# Patient Record
Sex: MALE | Race: WHITE | NOT HISPANIC OR LATINO | Employment: OTHER | URBAN - METROPOLITAN AREA
[De-identification: names, ages, dates, MRNs, and addresses within clinical notes are randomized per-mention and may not be internally consistent; named-entity substitution may affect disease eponyms.]

---

## 2020-06-17 ENCOUNTER — HOSPITAL ENCOUNTER (EMERGENCY)
Facility: HOSPITAL | Age: 75
Discharge: HOME | End: 2020-06-17
Attending: EMERGENCY MEDICINE | Admitting: EMERGENCY MEDICINE

## 2020-06-17 VITALS
SYSTOLIC BLOOD PRESSURE: 128 MMHG | DIASTOLIC BLOOD PRESSURE: 79 MMHG | TEMPERATURE: 97.5 F | BODY MASS INDEX: 26.02 KG/M2 | RESPIRATION RATE: 16 BRPM | WEIGHT: 192.1 LBS | HEART RATE: 87 BPM | HEIGHT: 72 IN | OXYGEN SATURATION: 94 %

## 2020-06-17 DIAGNOSIS — I47.1 SUPRAVENTRICULAR TACHYCARDIA (HCC): Primary | ICD-10-CM

## 2020-06-17 LAB
ALBUMIN SERPL-MCNC: 4.6 G/DL (ref 3.5–5.2)
ALBUMIN/GLOB SERPL: 1.6 G/DL
ALP SERPL-CCNC: 85 U/L (ref 39–117)
ALT SERPL W P-5'-P-CCNC: 16 U/L (ref 1–41)
ANION GAP SERPL CALCULATED.3IONS-SCNC: 14.2 MMOL/L (ref 5–15)
AST SERPL-CCNC: 23 U/L (ref 1–40)
BASOPHILS # BLD AUTO: 0.04 10*3/MM3 (ref 0–0.2)
BASOPHILS NFR BLD AUTO: 0.4 % (ref 0–1.5)
BILIRUB SERPL-MCNC: 0.4 MG/DL (ref 0.2–1.2)
BUN BLD-MCNC: 14 MG/DL (ref 8–23)
BUN/CREAT SERPL: 12.2 (ref 7–25)
CALCIUM SPEC-SCNC: 10 MG/DL (ref 8.6–10.5)
CHLORIDE SERPL-SCNC: 100 MMOL/L (ref 98–107)
CO2 SERPL-SCNC: 24.8 MMOL/L (ref 22–29)
CREAT BLD-MCNC: 1.15 MG/DL (ref 0.76–1.27)
DEPRECATED RDW RBC AUTO: 40.7 FL (ref 37–54)
EOSINOPHIL # BLD AUTO: 0.16 10*3/MM3 (ref 0–0.4)
EOSINOPHIL NFR BLD AUTO: 1.6 % (ref 0.3–6.2)
ERYTHROCYTE [DISTWIDTH] IN BLOOD BY AUTOMATED COUNT: 13.2 % (ref 12.3–15.4)
GFR SERPL CREATININE-BSD FRML MDRD: 62 ML/MIN/1.73
GLOBULIN UR ELPH-MCNC: 2.9 GM/DL
GLUCOSE BLD-MCNC: 114 MG/DL (ref 65–99)
HCT VFR BLD AUTO: 42.3 % (ref 37.5–51)
HGB BLD-MCNC: 14.6 G/DL (ref 13–17.7)
IMM GRANULOCYTES # BLD AUTO: 0.03 10*3/MM3 (ref 0–0.05)
IMM GRANULOCYTES NFR BLD AUTO: 0.3 % (ref 0–0.5)
LYMPHOCYTES # BLD AUTO: 2.94 10*3/MM3 (ref 0.7–3.1)
LYMPHOCYTES NFR BLD AUTO: 29.8 % (ref 19.6–45.3)
MAGNESIUM SERPL-MCNC: 1.7 MG/DL (ref 1.6–2.4)
MCH RBC QN AUTO: 29.7 PG (ref 26.6–33)
MCHC RBC AUTO-ENTMCNC: 34.5 G/DL (ref 31.5–35.7)
MCV RBC AUTO: 86 FL (ref 79–97)
MONOCYTES # BLD AUTO: 0.74 10*3/MM3 (ref 0.1–0.9)
MONOCYTES NFR BLD AUTO: 7.5 % (ref 5–12)
NEUTROPHILS # BLD AUTO: 5.96 10*3/MM3 (ref 1.7–7)
NEUTROPHILS NFR BLD AUTO: 60.4 % (ref 42.7–76)
NRBC BLD AUTO-RTO: 0 /100 WBC (ref 0–0.2)
PLATELET # BLD AUTO: 239 10*3/MM3 (ref 140–450)
PMV BLD AUTO: 10.3 FL (ref 6–12)
POTASSIUM BLD-SCNC: 4.1 MMOL/L (ref 3.5–5.2)
PROT SERPL-MCNC: 7.5 G/DL (ref 6–8.5)
RBC # BLD AUTO: 4.92 10*6/MM3 (ref 4.14–5.8)
SODIUM BLD-SCNC: 139 MMOL/L (ref 136–145)
TROPONIN T SERPL-MCNC: <0.01 NG/ML (ref 0–0.03)
TSH SERPL DL<=0.05 MIU/L-ACNC: 2.28 UIU/ML (ref 0.27–4.2)
WBC NRBC COR # BLD: 9.87 10*3/MM3 (ref 3.4–10.8)

## 2020-06-17 PROCEDURE — 99283 EMERGENCY DEPT VISIT LOW MDM: CPT

## 2020-06-17 PROCEDURE — 84443 ASSAY THYROID STIM HORMONE: CPT | Performed by: EMERGENCY MEDICINE

## 2020-06-17 PROCEDURE — 25010000002 ADENOSINE PER 6 MG: Performed by: EMERGENCY MEDICINE

## 2020-06-17 PROCEDURE — 96374 THER/PROPH/DIAG INJ IV PUSH: CPT

## 2020-06-17 PROCEDURE — 83735 ASSAY OF MAGNESIUM: CPT | Performed by: EMERGENCY MEDICINE

## 2020-06-17 PROCEDURE — 93005 ELECTROCARDIOGRAM TRACING: CPT | Performed by: EMERGENCY MEDICINE

## 2020-06-17 PROCEDURE — 85025 COMPLETE CBC W/AUTO DIFF WBC: CPT | Performed by: EMERGENCY MEDICINE

## 2020-06-17 PROCEDURE — 84484 ASSAY OF TROPONIN QUANT: CPT | Performed by: EMERGENCY MEDICINE

## 2020-06-17 PROCEDURE — 93010 ELECTROCARDIOGRAM REPORT: CPT | Performed by: INTERNAL MEDICINE

## 2020-06-17 PROCEDURE — 80053 COMPREHEN METABOLIC PANEL: CPT | Performed by: EMERGENCY MEDICINE

## 2020-06-17 RX ORDER — SODIUM CHLORIDE 0.9 % (FLUSH) 0.9 %
10 SYRINGE (ML) INJECTION AS NEEDED
Status: DISCONTINUED | OUTPATIENT
Start: 2020-06-17 | End: 2020-06-17 | Stop reason: HOSPADM

## 2020-06-17 RX ORDER — ADENOSINE 3 MG/ML
6 INJECTION, SOLUTION INTRAVENOUS ONCE
Status: COMPLETED | OUTPATIENT
Start: 2020-06-17 | End: 2020-06-17

## 2020-06-17 RX ADMIN — SODIUM CHLORIDE, PRESERVATIVE FREE 10 ML: 5 INJECTION INTRAVENOUS at 19:17

## 2020-06-17 RX ADMIN — ADENOSINE 6 MG: 3 INJECTION, SOLUTION INTRAVENOUS at 19:29

## 2020-06-17 NOTE — ED PROVIDER NOTES
EMERGENCY DEPARTMENT ENCOUNTER    Room Number:  23/23  Date of encounter:  6/17/2020  PCP: Vasile Saba MD  Historian: Patient     I used full protective equipment while examining this patient.  This includes face mask, gloves and protective eyewear.  I washed my hands before entering the room and immediately upon leaving the room      HPI:  Chief Complaint: Rapid heart rate  A complete HPI/ROS/PMH/PSH/SH/FH are unobtainable due to: None    Context: Brad Hernadez is a 74 y.o. male who presents to the ED c/o rapid heart rate.  Symptoms began about 30 minutes ago and are constant.  Patient denies any chest pain or shortness of breath.  He states that he can tell that his heart is fast and found to be fast when he checked with his apple watch.  Patient is had never had prior symptoms in the past.  Nothing tends to make it better, nothing tends to make it worse.  Patient does have a history of hypertension hypercholesterolemia and diabetes.  He does not see a cardiologist and has no chronic heart conditions.  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  Primary care provider is Dr. José Luis Saba and he does not have a cardiologist.      PAST MEDICAL HISTORY  Active Ambulatory Problems     Diagnosis Date Noted   • No Active Ambulatory Problems     Resolved Ambulatory Problems     Diagnosis Date Noted   • No Resolved Ambulatory Problems     No Additional Past Medical History         PAST SURGICAL HISTORY  No past surgical history on file.      FAMILY HISTORY  No family history on file.      SOCIAL HISTORY  Social History     Socioeconomic History   • Marital status:      Spouse name: Not on file   • Number of children: Not on file   • Years of education: Not on file   • Highest education level: Not on file         ALLERGIES  Patient has no known allergies.       REVIEW OF SYSTEMS  Review of Systems   Constitutional: Negative.  Negative for fever.   HENT: Negative.  Negative  for sore throat.    Eyes: Negative.    Respiratory: Negative.  Negative for cough.    Cardiovascular: Positive for palpitations. Negative for chest pain.   Gastrointestinal: Negative.    Genitourinary: Negative.  Negative for dysuria.   Musculoskeletal: Negative.  Negative for back pain.   Skin: Negative.  Negative for rash.   Neurological: Negative.  Negative for headaches.   All other systems reviewed and are negative.          PHYSICAL EXAM    I have reviewed the triage vital signs and nursing notes.    ED Triage Vitals [06/17/20 1859]   Temp Heart Rate Resp BP SpO2   -- (!) 184 -- -- --      Temp src Heart Rate Source Patient Position BP Location FiO2 (%)   -- -- -- -- --       Physical Exam  GENERAL: Alert male in no obvious distress  HENT: nares patent  EYES: no scleral icterus  CV: Tachycardic without murmur  RESPIRATORY: normal effort clear to auscultation bilaterally-saturations 99% on room air  ABDOMEN: soft, nontender-no masses  MUSCULOSKELETAL: no deformity, no swelling or tenderness to palpation  NEURO: Strength, sensation, and coordination are grossly intact.  Speech and mentation are unremarkable  SKIN: warm, dry      LAB RESULTS  Recent Results (from the past 24 hour(s))   Comprehensive Metabolic Panel    Collection Time: 06/17/20  7:15 PM   Result Value Ref Range    Glucose 114 (H) 65 - 99 mg/dL    BUN 14 8 - 23 mg/dL    Creatinine 1.15 0.76 - 1.27 mg/dL    Sodium 139 136 - 145 mmol/L    Potassium 4.1 3.5 - 5.2 mmol/L    Chloride 100 98 - 107 mmol/L    CO2 24.8 22.0 - 29.0 mmol/L    Calcium 10.0 8.6 - 10.5 mg/dL    Total Protein 7.5 6.0 - 8.5 g/dL    Albumin 4.60 3.50 - 5.20 g/dL    ALT (SGPT) 16 1 - 41 U/L    AST (SGOT) 23 1 - 40 U/L    Alkaline Phosphatase 85 39 - 117 U/L    Total Bilirubin 0.4 0.2 - 1.2 mg/dL    eGFR Non African Amer 62 >60 mL/min/1.73    Globulin 2.9 gm/dL    A/G Ratio 1.6 g/dL    BUN/Creatinine Ratio 12.2 7.0 - 25.0    Anion Gap 14.2 5.0 - 15.0 mmol/L   Troponin    Collection  Time: 06/17/20  7:15 PM   Result Value Ref Range    Troponin T <0.010 0.000 - 0.030 ng/mL   Magnesium    Collection Time: 06/17/20  7:15 PM   Result Value Ref Range    Magnesium 1.7 1.6 - 2.4 mg/dL   TSH    Collection Time: 06/17/20  7:15 PM   Result Value Ref Range    TSH 2.280 0.270 - 4.200 uIU/mL   CBC Auto Differential    Collection Time: 06/17/20  7:15 PM   Result Value Ref Range    WBC 9.87 3.40 - 10.80 10*3/mm3    RBC 4.92 4.14 - 5.80 10*6/mm3    Hemoglobin 14.6 13.0 - 17.7 g/dL    Hematocrit 42.3 37.5 - 51.0 %    MCV 86.0 79.0 - 97.0 fL    MCH 29.7 26.6 - 33.0 pg    MCHC 34.5 31.5 - 35.7 g/dL    RDW 13.2 12.3 - 15.4 %    RDW-SD 40.7 37.0 - 54.0 fl    MPV 10.3 6.0 - 12.0 fL    Platelets 239 140 - 450 10*3/mm3    Neutrophil % 60.4 42.7 - 76.0 %    Lymphocyte % 29.8 19.6 - 45.3 %    Monocyte % 7.5 5.0 - 12.0 %    Eosinophil % 1.6 0.3 - 6.2 %    Basophil % 0.4 0.0 - 1.5 %    Immature Grans % 0.3 0.0 - 0.5 %    Neutrophils, Absolute 5.96 1.70 - 7.00 10*3/mm3    Lymphocytes, Absolute 2.94 0.70 - 3.10 10*3/mm3    Monocytes, Absolute 0.74 0.10 - 0.90 10*3/mm3    Eosinophils, Absolute 0.16 0.00 - 0.40 10*3/mm3    Basophils, Absolute 0.04 0.00 - 0.20 10*3/mm3    Immature Grans, Absolute 0.03 0.00 - 0.05 10*3/mm3    nRBC 0.0 0.0 - 0.2 /100 WBC       Ordered the above labs and independently reviewed the results.      RADIOLOGY  No Radiology Exams Resulted Within Past 24 Hours    I ordered the above noted radiological studies. Reviewed by me and discussed with radiologist.  See dictation for official radiology interpretation.      PROCEDURES  Chemical Cardioversion  Date/Time: 6/17/2020 7:34 PM  Performed by: Vishal Rueda MD  Authorized by: Vishal Rueda MD     Consent:     Consent obtained:  Emergent situation  Pre-procedure details:     Cardioversion basis:  Emergent    Rhythm:  Supraventricular tachycardia  Attempt one:     Cardioversion medication:  Adenosine 6mg      Medication outcome:  Conversion to normal  sinus rhythm  Post-procedure details:     Patient status:  Alert    Patient tolerance of procedure:  Tolerated well, no immediate complications          MEDICATIONS GIVEN IN ER    Medications   sodium chloride 0.9 % flush 10 mL (10 mL Intravenous Given 6/17/20 1917)   adenosine (ADENOCARD) injection 6 mg (6 mg Intravenous Given 6/17/20 1929)         PROGRESS, DATA ANALYSIS, CONSULTS, AND MEDICAL DECISION MAKING    All labs have been independently reviewed by me.  All radiology studies have been reviewed by me and discussed with radiologist dictating the report.   EKG's independently viewed and interpreted by me.  Discussion below represents my analysis of pertinent findings related to patient's condition, differential diagnosis, treatment plan and final disposition.      ED Course as of Jun 17 2004 Wed Jun 17, 2020 1906   EKG          EKG time: 1905  Rhythm/Rate:   P waves and NV: P waves not noted  QRS, axis: Normal axis, LVH  ST and T waves: Nonspecific changes    Interpreted Contemporaneously by me, independently viewed  No prior to compare      [DB]   1933 Patient was cardioverted with 6 mg of adenosine successfully.  He is in apparent sinus rhythm and feeling much better.    [DB]   1937 EKG          EKG time: 1935  Rhythm/Rate: Sinus 78  P waves and NV: Normal P waves and NV intervals  QRS, axis: Normal axis, normal QRS  ST and T waves: Mild lateral ST depression    Interpreted Contemporaneously by me, independently viewed  Heart rate is much improved compared to prior      [DB]   2003 Labs are reviewed and are unremarkable.  This includes a negative bone.  CBC CMP, TSH and magnesium are also all remarkable.  Patient is remained in normal sinus rhythm and feeling well.  At this point I think he is safe to be discharged and follow-up with cardiology as an outpatient.    [DB]      ED Course User Index  [DB] Vishal Rueda MD       AS OF 20:04 VITALS:    BP - 135/67  HR - 82  TEMP - 97.3 °F (36.3 °C)  (Tympanic)  O2 SATS - 97%      DIAGNOSIS  Final diagnoses:   Supraventricular tachycardia (CMS/HCC)         DISPOSITION  DISCHARGE    Patient discharged in stable condition.    Reviewed implications of results, diagnosis, meds, responsibility to follow up, warning signs and symptoms of possible worsening, potential complications and reasons to return to ER, including increased chest pain, shortness of breath or as needed.    Patient/Family voiced understanding of above instructions.    Discussed plan for discharge, as there is no emergent indication for admission. Patient referred to primary care provider for BP management due to today's BP. Pt/family is agreeable and understands need for follow up and repeat testing.  Pt is aware that discharge does not mean that nothing is wrong but it indicates no emergency is present that requires admission and they must continue care with follow-up as given below or physician of their choice.     FOLLOW-UP  Vasile Saba MD  [address]  [facility] [address] [phone]      Call for Appointment    [facility]  [address] [facility] [address] [phone]    Call for Appointment         Medication List      No changes were made to your prescriptions during this visit.                Vishal Rueda MD  06/17/20 2004

## 2020-06-18 NOTE — DISCHARGE INSTRUCTIONS
Follow-up with your primary care provider or cardiologist for further evaluation of this abnormal heart rhythm.  Return to the ED for worsening symptoms or as needed.

## 2020-06-29 DIAGNOSIS — I47.1 PAROXYSMAL SVT (SUPRAVENTRICULAR TACHYCARDIA) (HCC): Primary | ICD-10-CM

## 2020-06-29 PROBLEM — I47.10 PAROXYSMAL SVT (SUPRAVENTRICULAR TACHYCARDIA): Status: ACTIVE | Noted: 2020-06-29

## 2020-07-03 ENCOUNTER — HOSPITAL ENCOUNTER (OUTPATIENT)
Facility: HOSPITAL | Age: 75
Setting detail: HOSPITAL OUTPATIENT SURGERY
Discharge: HOME | End: 2020-07-03
Attending: INTERNAL MEDICINE | Admitting: INTERNAL MEDICINE

## 2020-07-03 VITALS
WEIGHT: 188.25 LBS | RESPIRATION RATE: 16 BRPM | SYSTOLIC BLOOD PRESSURE: 176 MMHG | HEIGHT: 72 IN | DIASTOLIC BLOOD PRESSURE: 93 MMHG | HEART RATE: 60 BPM | TEMPERATURE: 97.6 F | BODY MASS INDEX: 25.5 KG/M2 | OXYGEN SATURATION: 98 %

## 2020-07-03 DIAGNOSIS — I47.1 PAROXYSMAL SVT (SUPRAVENTRICULAR TACHYCARDIA) (HCC): ICD-10-CM

## 2020-07-03 LAB — SARS-COV-2 RDRP RESP QL NAA+PROBE: NOT DETECTED

## 2020-07-03 PROCEDURE — 99152 MOD SED SAME PHYS/QHP 5/>YRS: CPT | Performed by: INTERNAL MEDICINE

## 2020-07-03 PROCEDURE — 25010000002 MIDAZOLAM PER 1 MG: Performed by: INTERNAL MEDICINE

## 2020-07-03 PROCEDURE — C1733 CATH, EP, OTHR THAN COOL-TIP: HCPCS | Performed by: INTERNAL MEDICINE

## 2020-07-03 PROCEDURE — 93653 COMPRE EP EVAL TX SVT: CPT | Performed by: INTERNAL MEDICINE

## 2020-07-03 PROCEDURE — 93005 ELECTROCARDIOGRAM TRACING: CPT | Performed by: INTERNAL MEDICINE

## 2020-07-03 PROCEDURE — 82962 GLUCOSE BLOOD TEST: CPT

## 2020-07-03 PROCEDURE — C1766 INTRO/SHEATH,STRBLE,NON-PEEL: HCPCS | Performed by: INTERNAL MEDICINE

## 2020-07-03 PROCEDURE — C1894 INTRO/SHEATH, NON-LASER: HCPCS | Performed by: INTERNAL MEDICINE

## 2020-07-03 PROCEDURE — 87635 SARS-COV-2 COVID-19 AMP PRB: CPT | Performed by: INTERNAL MEDICINE

## 2020-07-03 PROCEDURE — 93613 INTRACARDIAC EPHYS 3D MAPG: CPT | Performed by: INTERNAL MEDICINE

## 2020-07-03 PROCEDURE — C1730 CATH, EP, 19 OR FEW ELECT: HCPCS | Performed by: INTERNAL MEDICINE

## 2020-07-03 PROCEDURE — 99153 MOD SED SAME PHYS/QHP EA: CPT | Performed by: INTERNAL MEDICINE

## 2020-07-03 PROCEDURE — 25010000002 FENTANYL CITRATE (PF) 100 MCG/2ML SOLUTION: Performed by: INTERNAL MEDICINE

## 2020-07-03 PROCEDURE — C9803 HOPD COVID-19 SPEC COLLECT: HCPCS | Performed by: INTERNAL MEDICINE

## 2020-07-03 PROCEDURE — 93010 ELECTROCARDIOGRAM REPORT: CPT | Performed by: INTERNAL MEDICINE

## 2020-07-03 PROCEDURE — 93623 PRGRMD STIMJ&PACG IV RX NFS: CPT | Performed by: INTERNAL MEDICINE

## 2020-07-03 RX ORDER — ATORVASTATIN CALCIUM 20 MG/1
20 TABLET, FILM COATED ORAL DAILY
COMMUNITY

## 2020-07-03 RX ORDER — SODIUM CHLORIDE 9 MG/ML
75 INJECTION, SOLUTION INTRAVENOUS CONTINUOUS
Status: DISCONTINUED | OUTPATIENT
Start: 2020-07-03 | End: 2020-07-07 | Stop reason: HOSPADM

## 2020-07-03 RX ORDER — SODIUM CHLORIDE 0.9 % (FLUSH) 0.9 %
10 SYRINGE (ML) INJECTION AS NEEDED
Status: DISCONTINUED | OUTPATIENT
Start: 2020-07-03 | End: 2020-07-07 | Stop reason: HOSPADM

## 2020-07-03 RX ORDER — LIDOCAINE HYDROCHLORIDE 20 MG/ML
INJECTION, SOLUTION INFILTRATION; PERINEURAL AS NEEDED
Status: DISCONTINUED | OUTPATIENT
Start: 2020-07-03 | End: 2020-07-03 | Stop reason: HOSPADM

## 2020-07-03 RX ORDER — MIDAZOLAM HYDROCHLORIDE 1 MG/ML
INJECTION INTRAMUSCULAR; INTRAVENOUS AS NEEDED
Status: DISCONTINUED | OUTPATIENT
Start: 2020-07-03 | End: 2020-07-03 | Stop reason: HOSPADM

## 2020-07-03 RX ORDER — LORATADINE 10 MG/1
10 TABLET ORAL DAILY
COMMUNITY

## 2020-07-03 RX ORDER — TAMSULOSIN HYDROCHLORIDE 0.4 MG/1
1 CAPSULE ORAL DAILY
COMMUNITY

## 2020-07-03 RX ORDER — GLIMEPIRIDE 2 MG/1
2 TABLET ORAL
COMMUNITY

## 2020-07-03 RX ORDER — SODIUM CHLORIDE 0.9 % (FLUSH) 0.9 %
3 SYRINGE (ML) INJECTION EVERY 12 HOURS SCHEDULED
Status: DISCONTINUED | OUTPATIENT
Start: 2020-07-03 | End: 2020-07-07 | Stop reason: HOSPADM

## 2020-07-03 RX ORDER — MELATONIN
2000 DAILY
COMMUNITY

## 2020-07-03 RX ORDER — OLMESARTAN MEDOXOMIL 20 MG/1
20 TABLET ORAL DAILY
COMMUNITY

## 2020-07-03 RX ORDER — FENTANYL CITRATE 50 UG/ML
INJECTION, SOLUTION INTRAMUSCULAR; INTRAVENOUS AS NEEDED
Status: DISCONTINUED | OUTPATIENT
Start: 2020-07-03 | End: 2020-07-03 | Stop reason: HOSPADM

## 2020-07-03 RX ORDER — ASPIRIN 81 MG/1
81 TABLET ORAL DAILY
COMMUNITY

## 2020-07-03 RX ORDER — ONDANSETRON 2 MG/ML
4 INJECTION INTRAMUSCULAR; INTRAVENOUS EVERY 6 HOURS PRN
Status: DISCONTINUED | OUTPATIENT
Start: 2020-07-03 | End: 2020-07-07 | Stop reason: HOSPADM

## 2020-07-03 RX ADMIN — SODIUM CHLORIDE 75 ML/HR: 9 INJECTION, SOLUTION INTRAVENOUS at 10:28

## 2020-07-03 NOTE — H&P
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  He was seen in the ER at [facility] with an episode of tachy palpitations and was found to have SVT at a rate of 174bp.  His echo was unremarkable.    Review of Systems   Constitutional: Negative for fatigue.   Cardiovascular: Positive for palpitations.   All other systems reviewed and are negative.         Past Medical History:   Diagnosis Date   • Supraventricular tachycardia (CMS/HCC)        Objective      Vital Signs       Physical Exam   Constitutional: He is oriented to person, place, and time. He appears well-developed and well-nourished.   HENT:   Head: Normocephalic and atraumatic.   Eyes: Pupils are equal, round, and reactive to light. EOM are normal.   Neck: Normal range of motion. Neck supple.   Cardiovascular: Normal rate and regular rhythm.   Pulmonary/Chest: Effort normal and breath sounds normal.   Abdominal: Soft. Bowel sounds are normal.   Musculoskeletal: Normal range of motion.   Neurological: He is alert and oriented to person, place, and time.   Skin: Skin is warm and dry.   Psychiatric: He has a normal mood and affect.       Results Review:    I reviewed the patient's new clinical results.      Assessment/Plan       Paroxysmal SVT (supraventricular tachycardia) (CMS/HCC)      Assessment & Plan   1. 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    I discussed the patients findings and my recommendations with patient    Bhupinder Bolden MD  07/03/20  07:18

## 2020-07-03 NOTE — DISCHARGE SUMMARY
\[facility] [place] [facility]\"" [facility]    DISCHARGE SUMMARY      Patient Name: Brad Hernadez  :1945  74 y.o.    Date of Admit: (Not on file)  Date of Discharge:  7/3/2020    Discharge Diagnosis:  Problem List Items Addressed This Visit        Cardiovascular and Mediastinum    * (Principal) Paroxysmal SVT (supraventricular tachycardia) (CMS/[facility])    Relevant Orders    EP/CRM Study          Hospital Course: Patient was brought to the EP lab where he was found to have dual AV node physiology and inducible atrial echoes but no AVNRT.  He underwent successful slow pathway ablation without emily-procedural complications.      Procedures Performed  Procedure(s):  Ablation SVT  3D MAPPING INSYTE EP       Consults     No orders found from 2020 to 2020.          Pertinent Test Results:         Invalid input(s): LABALBU, PROT      @LABRCNT(bnp)@                    Condition on Discharge: stable    Discharge Medications     Discharge Medications      Notice    Cannot display discharge medications because the patient has not yet been admitted.         Discharge Diet: regular    Activity at Discharge:     Discharge disposition: home    Follow-up Appointments  No future appointments.      Test Results Pending at Discharge       Bhupinder Bolden MD, FACC    20  12:31

## 2020-07-03 NOTE — DISCHARGE INSTRUCTIONS
[facility]  Cardiology  [address] [place] [address]  [phone]    Coronary Ablation After Care    Refer to this sheet in the next few weeks. These instructions provide you with information on caring for yourself after your procedure. Your health care provider may also give you more specific instructions. Your treatment has been planned according to current medical practices, but problems sometimes occur. Call your health care provider if you have any problems or questions after your procedure.      What to Expect After the Procedure:  After your procedure, it is typical to have the following sensations:  · Minor discomfort or tenderness and a small bump at the catheter insertion site(s). The bump(s) should usually decrease in size and tenderness within 1 to 2 weeks.  · Any bruising will usually fade within 2 to 4 weeks.  Home Care Instructions:  · Do not apply powder or lotion to the site.  · Do not take baths, swim, or use a hot tub until your health care provider approves and the site is completely healed.  · Do not bend, squat, or lift anything over 20 lb (9 kg) or as directed by your health care provider. However, we recommend lifting nothing heavier than a gallon of milk.    · You may shower 24 hours after the procedure. Remove the bandage (dressing) and gently wash the site with plain soap and water. Gently pat the site dry. You may apply a band aid daily for 2 days if desired.    · Inspect the site at least twice daily.  · Increase your fluid intake for the next 2 days.    · Limit your activity for the first 48 hours.   · Avoid strenuous activity for 1 week or as advised by your physician.    · Follow instructions about when you can drive or return to work as directed by your physician.    · Hold direct pressure over the site when you cough, sneeze, laugh or change positions.  Do this for the next 2 days.    Call Your Doctor If:  · You have drainage (other than a small amount of  blood on the dressing).  · You have chills or a fever > 101.  · You have redness, warmth, swelling (larger than a walnut), or pain at the insertion   · You develop palpitations, chest pain or shortness of breath, feel faint, or pass out.  · You develop pain, discoloration, coldness, numbness, tingling, or severe bruising in the leg that held the catheter.  · You develop bleeding from any other place, such as the bowels.  · You have heavy bleeding from the site.  If this happens, hold pressure on the site and call 911.        Make Sure You:  · Understand these instructions.  · Will watch your condition.  · Will get help right away if you are not doing well or get worse.

## 2020-07-07 LAB — GLUCOSE BLDC GLUCOMTR-MCNC: 105 MG/DL (ref 70–130)

## 2022-04-08 ENCOUNTER — IMMUNIZATION (OUTPATIENT)
Dept: VACCINE CLINIC | Facility: HOSPITAL | Age: 77
End: 2022-04-08

## 2022-04-08 DIAGNOSIS — Z23 NEED FOR VACCINATION: Primary | ICD-10-CM

## 2022-04-08 PROCEDURE — 0054A HC ADM SARSCV2 30MCG TRS-SUCR BOOSTER: CPT | Performed by: INTERNAL MEDICINE

## 2022-04-08 PROCEDURE — 91305 HC SARSCOV2 VAC 30 MCG TRS-SUCR PFIZER: CPT | Performed by: INTERNAL MEDICINE

## 2024-12-18 ENCOUNTER — HOSPITAL ENCOUNTER (EMERGENCY)
Facility: HOSPITAL | Age: 79
Discharge: HOME | End: 2024-12-18
Attending: EMERGENCY MEDICINE | Admitting: EMERGENCY MEDICINE
Payer: MEDICARE

## 2024-12-18 VITALS
HEART RATE: 71 BPM | DIASTOLIC BLOOD PRESSURE: 67 MMHG | OXYGEN SATURATION: 98 % | RESPIRATION RATE: 16 BRPM | TEMPERATURE: 96 F | SYSTOLIC BLOOD PRESSURE: 111 MMHG

## 2024-12-18 DIAGNOSIS — E83.42 HYPOMAGNESEMIA: ICD-10-CM

## 2024-12-18 DIAGNOSIS — I47.10 SVT (SUPRAVENTRICULAR TACHYCARDIA): Primary | ICD-10-CM

## 2024-12-18 LAB
ANION GAP SERPL CALCULATED.3IONS-SCNC: 15.6 MMOL/L (ref 5–15)
BASOPHILS # BLD AUTO: 0.05 10*3/MM3 (ref 0–0.2)
BASOPHILS NFR BLD AUTO: 0.4 % (ref 0–1.5)
BUN SERPL-MCNC: 18 MG/DL (ref 8–23)
BUN/CREAT SERPL: 16.5 (ref 7–25)
CALCIUM SPEC-SCNC: 9.5 MG/DL (ref 8.6–10.5)
CHLORIDE SERPL-SCNC: 101 MMOL/L (ref 98–107)
CO2 SERPL-SCNC: 22.4 MMOL/L (ref 22–29)
CREAT SERPL-MCNC: 1.09 MG/DL (ref 0.76–1.27)
DEPRECATED RDW RBC AUTO: 44 FL (ref 37–54)
EGFRCR SERPLBLD CKD-EPI 2021: 69 ML/MIN/1.73
EOSINOPHIL # BLD AUTO: 0.16 10*3/MM3 (ref 0–0.4)
EOSINOPHIL NFR BLD AUTO: 1.1 % (ref 0.3–6.2)
ERYTHROCYTE [DISTWIDTH] IN BLOOD BY AUTOMATED COUNT: 13.7 % (ref 12.3–15.4)
GEN 5 1HR TROPONIN T REFLEX: 16 NG/L
GLUCOSE SERPL-MCNC: 137 MG/DL (ref 65–99)
HCT VFR BLD AUTO: 39.8 % (ref 37.5–51)
HGB BLD-MCNC: 13.7 G/DL (ref 13–17.7)
IMM GRANULOCYTES # BLD AUTO: 0.06 10*3/MM3 (ref 0–0.05)
IMM GRANULOCYTES NFR BLD AUTO: 0.4 % (ref 0–0.5)
LYMPHOCYTES # BLD AUTO: 2.05 10*3/MM3 (ref 0.7–3.1)
LYMPHOCYTES NFR BLD AUTO: 14.6 % (ref 19.6–45.3)
MAGNESIUM SERPL-MCNC: 1.5 MG/DL (ref 1.6–2.4)
MCH RBC QN AUTO: 30 PG (ref 26.6–33)
MCHC RBC AUTO-ENTMCNC: 34.4 G/DL (ref 31.5–35.7)
MCV RBC AUTO: 87.3 FL (ref 79–97)
MONOCYTES # BLD AUTO: 0.83 10*3/MM3 (ref 0.1–0.9)
MONOCYTES NFR BLD AUTO: 5.9 % (ref 5–12)
NEUTROPHILS NFR BLD AUTO: 10.9 10*3/MM3 (ref 1.7–7)
NEUTROPHILS NFR BLD AUTO: 77.6 % (ref 42.7–76)
NRBC BLD AUTO-RTO: 0 /100 WBC (ref 0–0.2)
PLATELET # BLD AUTO: 276 10*3/MM3 (ref 140–450)
PMV BLD AUTO: 10.2 FL (ref 6–12)
POTASSIUM SERPL-SCNC: 3.8 MMOL/L (ref 3.5–5.2)
QT INTERVAL: 269 MS
QTC INTERVAL: 457 MS
RBC # BLD AUTO: 4.56 10*6/MM3 (ref 4.14–5.8)
SODIUM SERPL-SCNC: 139 MMOL/L (ref 136–145)
TROPONIN T NUMERIC DELTA: 4 NG/L
TROPONIN T SERPL HS-MCNC: 12 NG/L
WBC NRBC COR # BLD AUTO: 14.05 10*3/MM3 (ref 3.4–10.8)

## 2024-12-18 PROCEDURE — 25010000002 ADENOSINE PER 6 MG: Performed by: EMERGENCY MEDICINE

## 2024-12-18 PROCEDURE — 85025 COMPLETE CBC W/AUTO DIFF WBC: CPT | Performed by: EMERGENCY MEDICINE

## 2024-12-18 PROCEDURE — 93010 ELECTROCARDIOGRAM REPORT: CPT | Performed by: INTERNAL MEDICINE

## 2024-12-18 PROCEDURE — 83735 ASSAY OF MAGNESIUM: CPT | Performed by: EMERGENCY MEDICINE

## 2024-12-18 PROCEDURE — 96374 THER/PROPH/DIAG INJ IV PUSH: CPT

## 2024-12-18 PROCEDURE — 80048 BASIC METABOLIC PNL TOTAL CA: CPT | Performed by: EMERGENCY MEDICINE

## 2024-12-18 PROCEDURE — 84484 ASSAY OF TROPONIN QUANT: CPT | Performed by: EMERGENCY MEDICINE

## 2024-12-18 PROCEDURE — 36415 COLL VENOUS BLD VENIPUNCTURE: CPT

## 2024-12-18 PROCEDURE — 93005 ELECTROCARDIOGRAM TRACING: CPT | Performed by: EMERGENCY MEDICINE

## 2024-12-18 PROCEDURE — 99285 EMERGENCY DEPT VISIT HI MDM: CPT

## 2024-12-18 RX ORDER — ADENOSINE 3 MG/ML
6 INJECTION, SOLUTION INTRAVENOUS ONCE
Status: COMPLETED | OUTPATIENT
Start: 2024-12-18 | End: 2024-12-18

## 2024-12-18 RX ADMIN — MAGNESIUM OXIDE 400 MG (241.3 MG MAGNESIUM) TABLET 400 MG: TABLET at 16:31

## 2024-12-18 RX ADMIN — ADENOSINE 6 MG: 3 INJECTION, SOLUTION INTRAVENOUS at 15:57

## 2024-12-18 NOTE — DISCHARGE INSTRUCTIONS
Follow-up with Dr. Bolden for further evaluation.  Return immediately for any further episodes of fast heart rate, any chest pain or shortness of breath.

## 2024-12-18 NOTE — ED PROVIDER NOTES
EMERGENCY DEPARTMENT ENCOUNTER  Room Number:  23/23  PCP: Jonathan Mccollum MD  Independent Historians: Patient and EMS      HPI:  Chief Complaint: had concerns including Rapid Heart Rate.     A complete HPI/ROS/PMH/PSH/SH/FH are unobtainable due to: None    Chronic or social conditions impacting patient care (Social Determinants of Health): None      Context: Brad Hernadez is a 79 y.o. male with a medical history of SVT, hypertension, diabetes who presents to the ED c/o acute palpitations.  The patient reports he was sitting and working at his computer when he started having palpitations.  He reports a history of SVT.  He reports he had an ablation several years ago.  He denies any chest pain or shortness of breath today.  He called EMS and they did an EKG that showed SVT.  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  He remains in SVT upon arrival.  He has no other symptoms.      Review of prior external notes (non-ED) -and- Review of prior external test results outside of this encounter:  Laboratory evaluation dated 3/27/2023 shows normal CMP .  Discharge summary 7/3/2020 where he was found to have dual AV node physiology and inducible atrial echoes but no AVNRT    Prescription drug monitoring program review:         PAST MEDICAL HISTORY  Active Ambulatory Problems     Diagnosis Date Noted    Paroxysmal SVT (supraventricular tachycardia) 06/29/2020     Resolved Ambulatory Problems     Diagnosis Date Noted    No Resolved Ambulatory Problems     Past Medical History:   Diagnosis Date    Diabetes mellitus     Hyperlipidemia     Hypertension     Supraventricular tachycardia          PAST SURGICAL HISTORY  Past Surgical History:   Procedure Laterality Date    CARDIAC CATHETERIZATION      CARDIAC ELECTROPHYSIOLOGY PROCEDURE N/A 7/3/2020    Procedure: Ablation SVT;  Surgeon: Bhupinder Bolden MD;  Location: [facility] INVASIVE LOCATION;  Service: Cardiovascular;   Laterality: N/A;    CYSTOSCOPY W/ LASER LITHOTRIPSY      LIPOMA EXCISION           FAMILY HISTORY  No family history on file.      SOCIAL HISTORY  Social History     Socioeconomic History    Marital status:    Tobacco Use    Smoking status: Former     Types: Cigars    Smokeless tobacco: Never   Substance and Sexual Activity    Alcohol use: Yes     Comment: OCCASIONALLY    Drug use: Never    Sexual activity: Defer         ALLERGIES  Patient has no known allergies.      REVIEW OF SYSTEMS  Review of Systems  Included in HPI  All systems reviewed and negative except for those discussed in HPI.      PHYSICAL EXAM    I have reviewed the triage vital signs and nursing notes.    ED Triage Vitals [12/18/24 1542]   Temp Heart Rate Resp BP SpO2   96 °F (35.6 °C) (!) 180 16 146/80 100 %      Temp src Heart Rate Source Patient Position BP Location FiO2 (%)   Tympanic -- -- -- --       Physical Exam  GENERAL: Awake, alert, no acute distress  SKIN: Warm, dry  HENT: Normocephalic, atraumatic  EYES: no scleral icterus  CV: regular rhythm, tachycardic rate  RESPIRATORY: normal effort, lungs clear  ABDOMEN: soft, nontender, nondistended  MUSCULOSKELETAL: no deformity, no calf tenderness or swelling  NEURO: alert, moves all extremities, follows commands            LAB RESULTS  Recent Results (from the past 24 hours)   Basic Metabolic Panel    Collection Time: 12/18/24  3:47 PM    Specimen: Blood   Result Value Ref Range    Glucose 137 (H) 65 - 99 mg/dL    BUN 18 8 - 23 mg/dL    Creatinine 1.09 0.76 - 1.27 mg/dL    Sodium 139 136 - 145 mmol/L    Potassium 3.8 3.5 - 5.2 mmol/L    Chloride 101 98 - 107 mmol/L    CO2 22.4 22.0 - 29.0 mmol/L    Calcium 9.5 8.6 - 10.5 mg/dL    BUN/Creatinine Ratio 16.5 7.0 - 25.0    Anion Gap 15.6 (H) 5.0 - 15.0 mmol/L    eGFR 69.0 >60.0 mL/min/1.73   High Sensitivity Troponin T    Collection Time: 12/18/24  3:47 PM    Specimen: Blood   Result Value Ref Range    HS Troponin T 12 <22 ng/L   Magnesium     Collection Time: 12/18/24  3:47 PM    Specimen: Blood   Result Value Ref Range    Magnesium 1.5 (L) 1.6 - 2.4 mg/dL   CBC Auto Differential    Collection Time: 12/18/24  3:47 PM    Specimen: Blood   Result Value Ref Range    WBC 14.05 (H) 3.40 - 10.80 10*3/mm3    RBC 4.56 4.14 - 5.80 10*6/mm3    Hemoglobin 13.7 13.0 - 17.7 g/dL    Hematocrit 39.8 37.5 - 51.0 %    MCV 87.3 79.0 - 97.0 fL    MCH 30.0 26.6 - 33.0 pg    MCHC 34.4 31.5 - 35.7 g/dL    RDW 13.7 12.3 - 15.4 %    RDW-SD 44.0 37.0 - 54.0 fl    MPV 10.2 6.0 - 12.0 fL    Platelets 276 140 - 450 10*3/mm3    Neutrophil % 77.6 (H) 42.7 - 76.0 %    Lymphocyte % 14.6 (L) 19.6 - 45.3 %    Monocyte % 5.9 5.0 - 12.0 %    Eosinophil % 1.1 0.3 - 6.2 %    Basophil % 0.4 0.0 - 1.5 %    Immature Grans % 0.4 0.0 - 0.5 %    Neutrophils, Absolute 10.90 (H) 1.70 - 7.00 10*3/mm3    Lymphocytes, Absolute 2.05 0.70 - 3.10 10*3/mm3    Monocytes, Absolute 0.83 0.10 - 0.90 10*3/mm3    Eosinophils, Absolute 0.16 0.00 - 0.40 10*3/mm3    Basophils, Absolute 0.05 0.00 - 0.20 10*3/mm3    Immature Grans, Absolute 0.06 (H) 0.00 - 0.05 10*3/mm3    nRBC 0.0 0.0 - 0.2 /100 WBC   ECG 12 Lead Rhythm Change    Collection Time: 12/18/24  3:49 PM   Result Value Ref Range    QT Interval 269 ms    QTC Interval 457 ms   High Sensitivity Troponin T 1Hr    Collection Time: 12/18/24  5:00 PM    Specimen: Blood   Result Value Ref Range    HS Troponin T 16 <22 ng/L    Troponin T Numeric Delta 4 (C) Abnormal if >/=3 ng/L         RADIOLOGY  No Radiology Exams Resulted Within Past 24 Hours      MEDICATIONS GIVEN IN ER  Medications   adenosine (ADENOCARD) injection 6 mg (6 mg Intravenous Given 12/18/24 1557)   magnesium oxide (MAG-OX) tablet 400 mg (400 mg Oral Given 12/18/24 1631)         ORDERS PLACED DURING THIS VISIT:  Orders Placed This Encounter   Procedures    Chemical Cardioversion    Basic Metabolic Panel    High Sensitivity Troponin T    Magnesium    CBC Auto Differential    High Sensitivity  Troponin T 1Hr    ECG 12 Lead Rhythm Change    CBC & Differential         OUTPATIENT MEDICATION MANAGEMENT:  No current Epic-ordered facility-administered medications on file.     Current Outpatient Medications Ordered in Epic   Medication Sig Dispense Refill    aspirin 81 MG EC tablet Take 81 mg by mouth Daily.      atorvastatin (LIPITOR) 20 MG tablet Take 20 mg by mouth Daily.      cholecalciferol (VITAMIN D3) 25 MCG (1000 UT) tablet Take 2,000 Units by mouth Daily.      glimepiride (AMARYL) 2 MG tablet Take 2 mg by mouth Every Morning Before Breakfast.      loratadine (CLARITIN) 10 MG tablet Take 10 mg by mouth Daily.      metFORMIN (GLUCOPHAGE) 500 MG tablet Take 500 mg by mouth 2 (Two) Times a Day With Meals.      olmesartan (BENICAR) 20 MG tablet Take 20 mg by mouth Daily.      tamsulosin (FLOMAX) 0.4 MG capsule 24 hr capsule Take 1 capsule by mouth Daily.           PROCEDURES  Chemical Cardioversion    Date/Time: 12/18/2024 4:00 PM    Performed by: Magdiel Fritz MD  Authorized by: Magdiel Fritz MD    Consent:     Consent obtained:  Verbal    Consent given by:  Patient    Risks discussed:  Induced arrhythmia and death    Alternatives discussed:  No treatment  Pre-procedure details:     Cardioversion basis:  Emergent    Rhythm:  Supraventricular tachycardia  Attempt one:     Cardioversion medication:  Adenosine 6mg      Medication outcome:  Conversion to normal sinus rhythm  Post-procedure details:     Patient status:  Awake    Patient tolerance of procedure:  Tolerated well, no immediate complications        Critical care provider statement:    Critical care time (minutes): 35.   Critical care time was exclusive of:  Separately billable procedures and treating other patients   Critical care was necessary to treat or prevent imminent or life-threatening deterioration of the following conditions:  Cardiac Failure   Critical care was time spent personally by me on the following activities:  Development of  treatment plan with patient or surrogate, discussions with consultants, evaluation of patient's response to treatment, examination of patient, obtaining history from patient or surrogate, ordering and performing treatments and interventions, ordering and review of laboratory studies, ordering and review of radiographic studies, pulse oximetry, re-evaluation of patient's condition and review of old charts. Critical Care indicators:        PROGRESS, DATA ANALYSIS, CONSULTS, AND MEDICAL DECISION MAKING  All labs have been independently interpreted by me.  All radiology studies have been reviewed by me. All EKG's have been independently viewed and interpreted by me.  Discussion below represents my analysis of pertinent findings related to patient's condition, differential diagnosis, treatment plan and final disposition.    Differential diagnosis includes but is not limited to SVT, V. tach, A-fib, arrhythmia, hypokalemia, hypomagnesemia.    Clinical Scores:                                       ED Course as of 12/18/24 1749   Wed Dec 18, 2024   1600 The patient is successfully converted with 6 mg of adenosine.  He reports he feels significantly better. [TR]   1602 EKG          EKG time: 1549  Rhythm/Rate: SVT, rate 173  P waves and OK: Not visualized  QRS, axis: Narrow QRS, normal axis  ST and T waves: Rate related ST depression laterally    Independently Interpreted by me  Increased rate changed compared to prior 7/3/2020   [TR]   6567 I reviewed the workup and findings with the patient and family at the bedside.  Answered all questions.  He has no chest pain.  He has no shortness of breath.  He remains in normal sinus rhythm.  His mag was a little low and we replaced it.  He does have a delta troponin of 4 however he did have a heart rate of 170 and has a history of SVT.  I do not think this represents an acute MI, PE.  We discussed this troponin elevation and agreed to discharge home with outpatient cardiology  follow-up for his SVT.  Again, he is a physician and quite medically literate. [TR]      ED Course User Index  [TR] Magdiel Fritz MD             AS OF 17:49 EST VITALS:    BP - 112/71  HR - 73  TEMP - 96 °F (35.6 °C) (Tympanic)  O2 SATS - 97%    COMPLEXITY OF CARE  Admission was considered but after careful review of the patient's presentation, physical examination, diagnostic results, and response to treatment the patient may be safely discharged with outpatient follow-up.      DIAGNOSIS  Final diagnoses:   SVT (supraventricular tachycardia)   Hypomagnesemia         DISPOSITION  ED Disposition       ED Disposition   Discharge    Condition   Stable    Comment   --                Please note that portions of this document were completed with a voice recognition program.    Note Disclaimer: At [facility], we believe that sharing information builds trust and better relationships. You are receiving this note because you recently visited [facility]. It is possible you will see health information before a provider has talked with you about it. This kind of information can be easy to misunderstand. To help you fully understand what it means for your health, we urge you to discuss this note with your provider.         Magdiel Fritz MD  12/18/24 2485

## 2025-02-06 ENCOUNTER — APPOINTMENT (OUTPATIENT)
Dept: CARDIOLOGY | Facility: HOSPITAL | Age: 80
End: 2025-02-06
Payer: MEDICARE

## 2025-02-06 ENCOUNTER — HOSPITAL ENCOUNTER (EMERGENCY)
Facility: HOSPITAL | Age: 80
Discharge: HOME | End: 2025-02-06
Attending: EMERGENCY MEDICINE
Payer: MEDICARE

## 2025-02-06 ENCOUNTER — APPOINTMENT (OUTPATIENT)
Dept: GENERAL RADIOLOGY | Facility: HOSPITAL | Age: 80
End: 2025-02-06
Payer: MEDICARE

## 2025-02-06 VITALS
RESPIRATION RATE: 18 BRPM | HEART RATE: 74 BPM | WEIGHT: 170 LBS | HEIGHT: 72 IN | DIASTOLIC BLOOD PRESSURE: 63 MMHG | TEMPERATURE: 97.6 F | SYSTOLIC BLOOD PRESSURE: 136 MMHG | BODY MASS INDEX: 23.03 KG/M2 | OXYGEN SATURATION: 98 %

## 2025-02-06 DIAGNOSIS — I47.10 SUPRAVENTRICULAR TACHYCARDIA, UNSPECIFIED: Primary | ICD-10-CM

## 2025-02-06 LAB
ALBUMIN SERPL-MCNC: 4.4 G/DL (ref 3.5–5.2)
ALBUMIN/GLOB SERPL: 1.8 G/DL
ALP SERPL-CCNC: 85 U/L (ref 39–117)
ALT SERPL W P-5'-P-CCNC: 9 U/L (ref 1–41)
ANION GAP SERPL CALCULATED.3IONS-SCNC: 10 MMOL/L (ref 5–15)
AST SERPL-CCNC: 14 U/L (ref 1–40)
BASOPHILS # BLD AUTO: 0.03 10*3/MM3 (ref 0–0.2)
BASOPHILS NFR BLD AUTO: 0.5 % (ref 0–1.5)
BILIRUB SERPL-MCNC: 0.3 MG/DL (ref 0–1.2)
BUN SERPL-MCNC: 16 MG/DL (ref 8–23)
BUN/CREAT SERPL: 19 (ref 7–25)
CALCIUM SPEC-SCNC: 9.5 MG/DL (ref 8.6–10.5)
CHLORIDE SERPL-SCNC: 103 MMOL/L (ref 98–107)
CO2 SERPL-SCNC: 25 MMOL/L (ref 22–29)
CREAT SERPL-MCNC: 0.84 MG/DL (ref 0.76–1.27)
DEPRECATED RDW RBC AUTO: 43.3 FL (ref 37–54)
EGFRCR SERPLBLD CKD-EPI 2021: 88.7 ML/MIN/1.73
EOSINOPHIL # BLD AUTO: 0.23 10*3/MM3 (ref 0–0.4)
EOSINOPHIL NFR BLD AUTO: 3.5 % (ref 0.3–6.2)
ERYTHROCYTE [DISTWIDTH] IN BLOOD BY AUTOMATED COUNT: 13.4 % (ref 12.3–15.4)
GEN 5 1HR TROPONIN T REFLEX: 14 NG/L
GLOBULIN UR ELPH-MCNC: 2.5 GM/DL
GLUCOSE SERPL-MCNC: 183 MG/DL (ref 65–99)
HCT VFR BLD AUTO: 39.8 % (ref 37.5–51)
HGB BLD-MCNC: 13.7 G/DL (ref 13–17.7)
IMM GRANULOCYTES # BLD AUTO: 0.02 10*3/MM3 (ref 0–0.05)
IMM GRANULOCYTES NFR BLD AUTO: 0.3 % (ref 0–0.5)
LYMPHOCYTES # BLD AUTO: 1.91 10*3/MM3 (ref 0.7–3.1)
LYMPHOCYTES NFR BLD AUTO: 28.9 % (ref 19.6–45.3)
MAGNESIUM SERPL-MCNC: 1.9 MG/DL (ref 1.6–2.4)
MCH RBC QN AUTO: 30.2 PG (ref 26.6–33)
MCHC RBC AUTO-ENTMCNC: 34.4 G/DL (ref 31.5–35.7)
MCV RBC AUTO: 87.9 FL (ref 79–97)
MONOCYTES # BLD AUTO: 0.44 10*3/MM3 (ref 0.1–0.9)
MONOCYTES NFR BLD AUTO: 6.7 % (ref 5–12)
NEUTROPHILS NFR BLD AUTO: 3.98 10*3/MM3 (ref 1.7–7)
NEUTROPHILS NFR BLD AUTO: 60.1 % (ref 42.7–76)
NRBC BLD AUTO-RTO: 0 /100 WBC (ref 0–0.2)
PHOSPHATE SERPL-MCNC: 2.2 MG/DL (ref 2.5–4.5)
PLATELET # BLD AUTO: 237 10*3/MM3 (ref 140–450)
PMV BLD AUTO: 9.8 FL (ref 6–12)
POTASSIUM SERPL-SCNC: 3.6 MMOL/L (ref 3.5–5.2)
PROT SERPL-MCNC: 6.9 G/DL (ref 6–8.5)
QT INTERVAL: 319 MS
QTC INTERVAL: 419 MS
RBC # BLD AUTO: 4.53 10*6/MM3 (ref 4.14–5.8)
SODIUM SERPL-SCNC: 138 MMOL/L (ref 136–145)
TROPONIN T NUMERIC DELTA: 5 NG/L
TROPONIN T SERPL HS-MCNC: 9 NG/L
WBC NRBC COR # BLD AUTO: 6.61 10*3/MM3 (ref 3.4–10.8)

## 2025-02-06 PROCEDURE — 93010 ELECTROCARDIOGRAM REPORT: CPT | Performed by: INTERNAL MEDICINE

## 2025-02-06 PROCEDURE — 83735 ASSAY OF MAGNESIUM: CPT | Performed by: EMERGENCY MEDICINE

## 2025-02-06 PROCEDURE — 80053 COMPREHEN METABOLIC PANEL: CPT | Performed by: EMERGENCY MEDICINE

## 2025-02-06 PROCEDURE — 71045 X-RAY EXAM CHEST 1 VIEW: CPT

## 2025-02-06 PROCEDURE — 93242 EXT ECG>48HR<7D RECORDING: CPT

## 2025-02-06 PROCEDURE — 84100 ASSAY OF PHOSPHORUS: CPT | Performed by: EMERGENCY MEDICINE

## 2025-02-06 PROCEDURE — 84484 ASSAY OF TROPONIN QUANT: CPT | Performed by: EMERGENCY MEDICINE

## 2025-02-06 PROCEDURE — 36415 COLL VENOUS BLD VENIPUNCTURE: CPT

## 2025-02-06 PROCEDURE — 99284 EMERGENCY DEPT VISIT MOD MDM: CPT

## 2025-02-06 PROCEDURE — 93005 ELECTROCARDIOGRAM TRACING: CPT | Performed by: EMERGENCY MEDICINE

## 2025-02-06 PROCEDURE — 85025 COMPLETE CBC W/AUTO DIFF WBC: CPT | Performed by: EMERGENCY MEDICINE

## 2025-02-06 NOTE — DISCHARGE INSTRUCTIONS
Return to the ER if you have prolonged tachycardia.  If you develop any chest pain, shortness of breath, dizziness, or other concerning symptoms, please return for reevaluation.    Please make a follow-up appointment with cardiology, either the referral I have made for you here or have your primary care doctor send a referral to a [place] doctor if you prefer.    You do have another episode of SVT, you can try the modified Valsalva maneuver, you can visit the website below for a video on how to try this.     [email or web address]

## 2025-02-06 NOTE — ED PROVIDER NOTES
EMERGENCY DEPARTMENT ENCOUNTER    History  Chief Complaint   Patient presents with    Rapid Heart Rate       History provided by: Patient and EMS     HPI:  Chief Complaint: Tachycardia    Context: Pt is a 79 y.o. male who presents with concern for tachycardia.  Patient is a retired physician, practiced fertility medicine.  He was working on his computer tonight after taking Ambien when he had the onset of palpitations.  He used his Apple Watch and check his radial pulse, and believed he was in SVT as this had been a problem for him before.  Tried some vagal maneuvers, but was unsuccessful.  He had ablation about 3 years ago.  EMS was called.  They report giving 1 dose of 6 mg adenosine with conversion to normal sinus rhythm after EKG showed SVT.    Symptoms are currently resolved.  He did note an episode about 4 to 6 weeks ago where he did receive adenosine for bout of SVT.  10 days ago, he believes he had another episode that was transient, about 60 seconds which resolved without intervention      Active Ambulatory Problems     Diagnosis Date Noted    Paroxysmal SVT (supraventricular tachycardia) 06/29/2020     Resolved Ambulatory Problems     Diagnosis Date Noted    No Resolved Ambulatory Problems     Past Medical History:   Diagnosis Date    Diabetes mellitus     Hyperlipidemia     Hypertension     Supraventricular tachycardia        Past Surgical History:   Procedure Laterality Date    CARDIAC CATHETERIZATION      CARDIAC ELECTROPHYSIOLOGY PROCEDURE N/A 7/3/2020    Procedure: Ablation SVT;  Surgeon: Bhupinder Bolden MD;  Location: [facility] INVASIVE LOCATION;  Service: Cardiovascular;  Laterality: N/A;    CYSTOSCOPY W/ LASER LITHOTRIPSY      LIPOMA EXCISION         Physical Exam  ED Triage Vitals [02/06/25 0214]   Temp Heart Rate Resp BP SpO2   -- 75 18 167/90 96 %      Temp src Heart Rate Source Patient Position BP Location FiO2 (%)   -- -- -- -- --     Physical Exam  Constitutional:       Appearance:  Normal appearance.   HENT:      Head: Atraumatic.   Eyes:      Conjunctiva/sclera: Conjunctivae normal.   Cardiovascular:      Rate and Rhythm: Normal rate and regular rhythm.      Heart sounds: No murmur heard.  Pulmonary:      Effort: Pulmonary effort is normal. No respiratory distress.      Breath sounds: Normal breath sounds.   Skin:     Capillary Refill: Capillary refill takes less than 2 seconds.   Neurological:      Mental Status: He is alert and oriented to person, place, and time.         Medical Decision Making:    Differential Diagnosis includes but not limited to: SVT, atrial fibrillation with rapid ventricular rate, AVNRT, electrolyte disturbance    Alternative Historian Required Due To: Additional details of treatment    Medical Record Review: Reviewed note from ablation 7/3/2020, reviewed family medicine office visit note 10/15/2024    Labs Results:  Recent Results (from the past 24 hours)   ECG 12 Lead Rhythm Change    Collection Time: 02/06/25  2:18 AM   Result Value Ref Range    QT Interval 319 ms    QTC Interval 419 ms   Comprehensive Metabolic Panel    Collection Time: 02/06/25  2:24 AM    Specimen: Blood   Result Value Ref Range    Glucose 183 (H) 65 - 99 mg/dL    BUN 16 8 - 23 mg/dL    Creatinine 0.84 0.76 - 1.27 mg/dL    Sodium 138 136 - 145 mmol/L    Potassium 3.6 3.5 - 5.2 mmol/L    Chloride 103 98 - 107 mmol/L    CO2 25.0 22.0 - 29.0 mmol/L    Calcium 9.5 8.6 - 10.5 mg/dL    Total Protein 6.9 6.0 - 8.5 g/dL    Albumin 4.4 3.5 - 5.2 g/dL    ALT (SGPT) 9 1 - 41 U/L    AST (SGOT) 14 1 - 40 U/L    Alkaline Phosphatase 85 39 - 117 U/L    Total Bilirubin 0.3 0.0 - 1.2 mg/dL    Globulin 2.5 gm/dL    A/G Ratio 1.8 g/dL    BUN/Creatinine Ratio 19.0 7.0 - 25.0    Anion Gap 10.0 5.0 - 15.0 mmol/L    eGFR 88.7 >60.0 mL/min/1.73   Phosphorus    Collection Time: 02/06/25  2:24 AM    Specimen: Blood   Result Value Ref Range    Phosphorus 2.2 (L) 2.5 - 4.5 mg/dL   Magnesium    Collection Time: 02/06/25   2:24 AM    Specimen: Blood   Result Value Ref Range    Magnesium 1.9 1.6 - 2.4 mg/dL   High Sensitivity Troponin T    Collection Time: 02/06/25  2:24 AM    Specimen: Blood   Result Value Ref Range    HS Troponin T 9 <22 ng/L   CBC Auto Differential    Collection Time: 02/06/25  2:24 AM    Specimen: Blood   Result Value Ref Range    WBC 6.61 3.40 - 10.80 10*3/mm3    RBC 4.53 4.14 - 5.80 10*6/mm3    Hemoglobin 13.7 13.0 - 17.7 g/dL    Hematocrit 39.8 37.5 - 51.0 %    MCV 87.9 79.0 - 97.0 fL    MCH 30.2 26.6 - 33.0 pg    MCHC 34.4 31.5 - 35.7 g/dL    RDW 13.4 12.3 - 15.4 %    RDW-SD 43.3 37.0 - 54.0 fl    MPV 9.8 6.0 - 12.0 fL    Platelets 237 140 - 450 10*3/mm3    Neutrophil % 60.1 42.7 - 76.0 %    Lymphocyte % 28.9 19.6 - 45.3 %    Monocyte % 6.7 5.0 - 12.0 %    Eosinophil % 3.5 0.3 - 6.2 %    Basophil % 0.5 0.0 - 1.5 %    Immature Grans % 0.3 0.0 - 0.5 %    Neutrophils, Absolute 3.98 1.70 - 7.00 10*3/mm3    Lymphocytes, Absolute 1.91 0.70 - 3.10 10*3/mm3    Monocytes, Absolute 0.44 0.10 - 0.90 10*3/mm3    Eosinophils, Absolute 0.23 0.00 - 0.40 10*3/mm3    Basophils, Absolute 0.03 0.00 - 0.20 10*3/mm3    Immature Grans, Absolute 0.02 0.00 - 0.05 10*3/mm3    nRBC 0.0 0.0 - 0.2 /100 WBC   High Sensitivity Troponin T 1Hr    Collection Time: 02/06/25  3:48 AM    Specimen: Blood   Result Value Ref Range    HS Troponin T 14 <22 ng/L    Troponin T Numeric Delta 5 (C) Abnormal if >/=3 ng/L     Lab Comments:  My independent interpretation of the above labs: benign       Radiology:  XR Chest 1 View    Result Date: 2/6/2025  SINGLE VIEW OF THE CHEST  HISTORY: Tachycardia  COMPARISON: None available.  FINDINGS: There is mild cardiomegaly. There is calcification of the aorta. No pneumothorax, pleural effusion, or acute infiltrate is seen.      No acute findings.  This report was finalized on 2/6/2025 2:53 AM by Dr. Josette Zamarripa M.D on Workstation: [id number]     Radiology Comments:  I ordered the above imaging and reviewed  the results.    My independent interpretation of the cxr: no acute findings     EKG Interpreted by me: Sinus tachycardia, nonspecific T wave changes, no ST segment elevation      Medications given in ED:  Medications - No data to display        Rationale:  This is a 79-year-old male with a past medical history of paroxysmal SVT status post ablation who is presenting for an episode of now resolved tachycardia.  When EMS arrived, the patient's heart rate was in 170s.  I reviewed the EKG they obtained, and is consistent with SVT.  6 mg of adenosine was administered and he converted to normal sinus rhythm.  He is now asymptomatic at the time of my evaluation.    He presents afebrile with unremarkable vital signs.  His cardiopulmonary exam is benign.  His EKG here did not demonstrate any evidence of EKG or arrhythmia.  Labs not revealing of any acute metabolic etiology of SVT.    Troponin is negative x 2.  There was some mild uptrend, but this is not unexpected given bout of tachycardia.  His symptoms are not consistent with ACS.     Chest x-ray did not demonstrate any other acute cardiopulmonary process as emergent cause of symptoms including pneumomediastinum, widened mediastinum, acute infiltrate or pneumothorax.    Will have a Zio patch placed for quantification of episodes.  Patient will need to follow-up with a new cardiologist given that his has retired.    Progress Notes:  4:28 AM EST: I saw and reevaluate the patient.  Resting comfortably in no acute distress.  Asymptomatic at time of my evaluation.  Zio patch has been placed.  I spoke with the patient about his ED work up, diagnosis and the plan for discharge. I discussed the importance of following up with his PC/cardiology.  Did discuss potentially initiating a beta-blocker, going to defer this at this time pending follow-up.  I instructed him to return to the Emergency Room for new or worsening symptoms. All of the pt's questions were answered. The patient  "is stable at time of discharge.        Diagnosis:  Final diagnoses:   Supraventricular tachycardia, unspecified       Follow Up:  Jonathan Mccollum MD  [id number]  [place] [facility]  [phone]    Call in 2 days  For reevaluation      Rx:    Prescriptions considered: metoprolol       Parts of this note may be an electronic transcription/translation of spoken language to printed text using the Dragon dictation system\"        Provider Note Signed by:     Donna Quiroz MD  02/06/25 0437    "

## 2025-02-06 NOTE — ED NOTES
Pt arrived via EMS from home. Pt reports he was having some left side rib pain and noticied his heart rate felt high. Pt checked and realized his HR was 160's. Pt AOx4. Pt received 6mg Adenosine and HR was lower PTA.

## 2025-02-17 ENCOUNTER — OFFICE VISIT (OUTPATIENT)
Dept: CARDIOLOGY | Facility: CLINIC | Age: 80
End: 2025-02-17
Payer: MEDICARE

## 2025-02-17 VITALS
DIASTOLIC BLOOD PRESSURE: 82 MMHG | HEIGHT: 72 IN | OXYGEN SATURATION: 99 % | SYSTOLIC BLOOD PRESSURE: 140 MMHG | WEIGHT: 181.8 LBS | BODY MASS INDEX: 24.62 KG/M2 | HEART RATE: 64 BPM

## 2025-02-17 DIAGNOSIS — E78.00 HYPERCHOLESTEROLEMIA: ICD-10-CM

## 2025-02-17 DIAGNOSIS — I47.10 PAROXYSMAL SVT (SUPRAVENTRICULAR TACHYCARDIA): ICD-10-CM

## 2025-02-17 DIAGNOSIS — R00.2 PALPITATIONS: Primary | ICD-10-CM

## 2025-02-17 DIAGNOSIS — I10 ESSENTIAL HYPERTENSION: ICD-10-CM

## 2025-02-17 PROCEDURE — 3079F DIAST BP 80-89 MM HG: CPT | Performed by: INTERNAL MEDICINE

## 2025-02-17 PROCEDURE — 99204 OFFICE O/P NEW MOD 45 MIN: CPT | Performed by: INTERNAL MEDICINE

## 2025-02-17 PROCEDURE — 3077F SYST BP >= 140 MM HG: CPT | Performed by: INTERNAL MEDICINE

## 2025-02-17 RX ORDER — LOSARTAN POTASSIUM 50 MG/1
100 TABLET ORAL DAILY
COMMUNITY
Start: 2024-10-15

## 2025-02-17 RX ORDER — METOPROLOL TARTRATE 50 MG
50 TABLET ORAL DAILY PRN
Qty: 30 TABLET | Refills: 3 | Status: SHIPPED | OUTPATIENT
Start: 2025-02-17

## 2025-02-17 RX ORDER — ZOLPIDEM TARTRATE 10 MG/1
1 TABLET ORAL NIGHTLY PRN
COMMUNITY
Start: 2024-12-06

## 2025-02-17 RX ORDER — AMLODIPINE BESYLATE 10 MG/1
10 TABLET ORAL DAILY
COMMUNITY
Start: 2025-01-12

## 2025-02-17 RX ORDER — ESOMEPRAZOLE MAGNESIUM 40 MG/1
40 CAPSULE, DELAYED RELEASE ORAL DAILY
COMMUNITY
Start: 2025-01-12

## 2025-02-17 NOTE — PROGRESS NOTES
PATIENTINFORMATION    Date of Office Visit: 2025  Encounter Provider: Mendoza Santana MD  Place of Service: [facility] CARDIOLOGY  Patient Name: Brad Hernadez  : 1945    Subjective:     Encounter Date:2025      Patient ID: Brad Hernadez is a 79 y.o. male.    Chief Complaint   Patient presents with    Rapid Heart Rate       HPI  Mr. 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  He had SVT ablation in the past  He had to go to the ER in  2024 and this with symptomatic SVT that failed to convert with Valsalva but converted with single dose of adenosine.  No significant associated symptoms.  No known immediate triggers but has been using Ambien for insomnia for the past 6 months.   Fairly active even if he does not exercise regularly  He has been well-controlled hypertension.   Resting heart rate is usually in the 50s       ROS  All systems reviewed and negative except as noted in HPI.    Past Medical History:   Diagnosis Date    Diabetes mellitus     Hyperlipidemia     Hypertension     Supraventricular tachycardia        Past Surgical History:   Procedure Laterality Date    CARDIAC CATHETERIZATION      CARDIAC ELECTROPHYSIOLOGY PROCEDURE N/A 7/3/2020    Procedure: Ablation SVT;  Surgeon: Bhupinder Bolden MD;  Location: [facility] INVASIVE LOCATION;  Service: Cardiovascular;  Laterality: N/A;    CYSTOSCOPY W/ LASER LITHOTRIPSY      LIPOMA EXCISION         Social History     Socioeconomic History    Marital status:    Tobacco Use    Smoking status: Former     Types: Cigars     Passive exposure: Past    Smokeless tobacco: Never   Vaping Use    Vaping status: Never Used   Substance and Sexual Activity    Alcohol use: Yes     Comment: OCCASIONALLY    Drug use: Never    Sexual activity: Defer       History reviewed. No pertinent family history.      Procedures       Objective:  "    /82   Pulse 64   Ht 182.9 cm (72\")   Wt 82.5 kg (181 lb 12.8 oz)   SpO2 99%   BMI 24.66 kg/m²  Body mass index is 24.66 kg/m².     Constitutional:       General: Not in acute distress.     Appearance: Well-developed. Not diaphoretic.   Eyes:      Pupils: Pupils are equal, round, and reactive to light.   HENT:      Head: Normocephalic and atraumatic.   Neck:      Thyroid: No thyromegaly.   Pulmonary:      Effort: Pulmonary effort is normal. No respiratory distress.      Breath sounds: Normal breath sounds. No wheezing. No rales.   Chest:      Chest wall: Not tender to palpatation.   Cardiovascular:      Normal rate. Regular rhythm.      No gallop.    Pulses:     Intact distal pulses.   Edema:     Peripheral edema absent.   Abdominal:      General: Bowel sounds are normal. There is no distension.      Palpations: Abdomen is soft.      Tenderness: There is no guarding.   Musculoskeletal: Normal range of motion.         General: No deformity.      Cervical back: Normal range of motion and neck supple. Skin:     General: Skin is warm and dry.      Findings: No rash.   Neurological:      Mental Status: Alert and oriented to person, place, and time.      Cranial Nerves: No cranial nerve deficit.      Deep Tendon Reflexes: Reflexes are normal and symmetric.   Psychiatric:         Judgment: Judgment normal.         Review Of Data: I have reviewed pertinent recent labs, images and documents and pertinent findings included in HPI or assessment below.          Assessment/Plan:   Recurrent symptomatic paroxsymal SVT- slow pw ablation in 2020 by Dr Bolden , ER visit in 12/2024 and this month treated with adenosine. One episode in between that resolved spontaneously.  Resting heart rate in the 50s.  He may not tolerate daily beta-blocker.  Also reports intermittent dizzy spell that sounded vertigo.  Essential hypertension well-controlled on current regimen  Hypercholesterolemia on statin   Type 2 DM on Rx     I will " send him to EP for evaluation for ablation   Echocardiogram   Otherwise continue current care    Diagnosis and plan of care discussed with patient and verbalized understanding.            Your medication list            Accurate as of February 17, 2025  4:29 PM. If you have any questions, ask your nurse or doctor.                START taking these medications        Instructions Last Dose Given Next Dose Due   metoprolol tartrate 50 MG tablet  Commonly known as: LOPRESSOR  Started by: Mendoza Santana      Take 1 tablet by mouth Daily As Needed (Palpitations).              CONTINUE taking these medications        Instructions Last Dose Given Next Dose Due   amLODIPine 10 MG tablet  Commonly known as: NORVASC      Take 1 tablet by mouth Daily.       aspirin 81 MG EC tablet      Take 1 tablet by mouth Daily.       atorvastatin 20 MG tablet  Commonly known as: LIPITOR      Take 1 tablet by mouth Daily.       cholecalciferol 25 MCG (1000 UT) tablet  Commonly known as: VITAMIN D3      Take 2 tablets by mouth Daily.       esomeprazole 40 MG capsule  Commonly known as: nexIUM      Take 1 capsule by mouth Daily.       glimepiride 2 MG tablet  Commonly known as: AMARYL      Take 1 tablet by mouth Every Morning Before Breakfast.       loratadine 10 MG tablet  Commonly known as: CLARITIN      Take 1 tablet by mouth Daily.       losartan 50 MG tablet  Commonly known as: COZAAR      Take 2 tablets by mouth Daily.       metFORMIN 500 MG tablet  Commonly known as: GLUCOPHAGE      Take 1 tablet by mouth 2 (Two) Times a Day With Meals.       tamsulosin 0.4 MG capsule 24 hr capsule  Commonly known as: FLOMAX      Take 1 capsule by mouth Daily.       zolpidem 10 MG tablet  Commonly known as: AMBIEN      Take 1 tablet by mouth At Night As Needed.              STOP taking these medications      olmesartan 20 MG tablet  Commonly known as: BENICAR  Stopped by: Mendoza Santana                  Where to Get Your Medications        These  medications were sent to [facility] [id number] - [place], [place] [address] [place] [facility] [place] [address] [facility] - [phone] [facility] [phone]   [address], [facility] [address]      Phone: [phone]   metoprolol tartrate 50 MG tablet             Mendoza Santana MD  02/17/25  16:29 EST

## 2025-02-20 ENCOUNTER — PATIENT ROUNDING (BHMG ONLY) (OUTPATIENT)
Dept: CARDIOLOGY | Facility: CLINIC | Age: 80
End: 2025-02-20
Payer: MEDICARE

## 2025-02-20 NOTE — PROGRESS NOTES
A My Chart message has been sent to the patient for PATIENT ROUNDING with [facility]

## 2025-02-21 LAB
CV ZIO BASELINE AVG BPM: 61 BPM
CV ZIO BASELINE BPM HIGH: 141 BPM
CV ZIO BASELINE BPM LOW: 43 BPM
CV ZIO DEVICE ANALYSIS TIME: NORMAL
CV ZIO ECT SVE COUNT: NORMAL EPISODES
CV ZIO ECT SVE CPLT COUNT: 113 EPISODES
CV ZIO ECT SVE CPLT FREQ: NORMAL
CV ZIO ECT SVE FREQ: NORMAL
CV ZIO ECT SVE TPLT COUNT: 15 EPISODES
CV ZIO ECT SVE TPLT FREQ: NORMAL
CV ZIO ECT VE COUNT: 35 EPISODES
CV ZIO ECT VE CPLT COUNT: 0 EPISODES
CV ZIO ECT VE CPLT FREQ: 0
CV ZIO ECT VE FREQ: NORMAL
CV ZIO ECT VE TPLT COUNT: 0 EPISODES
CV ZIO ECT VE TPLT FREQ: 0
CV ZIO ECTOPIC SVE COUPLET RAW PERCENT: 0.03 %
CV ZIO ECTOPIC SVE ISOLATED PERCENT: 2.29 %
CV ZIO ECTOPIC SVE TRIPLET RAW PERCENT: 0.01 %
CV ZIO ECTOPIC VE COUPLET RAW PERCENT: 0 %
CV ZIO ECTOPIC VE ISOLATED PERCENT: 0 %
CV ZIO ECTOPIC VE TRIPLET RAW PERCENT: 0 %
CV ZIO ENROLLMENT END: NORMAL
CV ZIO ENROLLMENT START: NORMAL
CV ZIO PATIENT EVENTS DIARIES: 0
CV ZIO PATIENT EVENTS TRIGGERS: 0
CV ZIO PAUSE COUNT: 0
CV ZIO PRESCRIPTION STATUS: NORMAL
CV ZIO SVT AVG BPM: 134 BPM
CV ZIO SVT BPM HIGH: 141 BPM
CV ZIO SVT BPM LOW: 122 BPM
CV ZIO SVT COUNT: 1
CV ZIO SVT F EPI AVG BPM: 134 BPM
CV ZIO SVT F EPI BEATS: 4 BEATS
CV ZIO SVT F EPI BPM HIGH: 141 BPM
CV ZIO SVT F EPI BPM LOW: 122 BPM
CV ZIO SVT F EPI DUR: 1.7 SEC
CV ZIO SVT F EPI END: NORMAL
CV ZIO SVT F EPI START: NORMAL
CV ZIO SVT L EPI AVG BPM: 134 BPM
CV ZIO SVT L EPI BEATS: 4 BEATS
CV ZIO SVT L EPI BPM HIGH: 141 BPM
CV ZIO SVT L EPI BPM LOW: 122 BPM
CV ZIO SVT L EPI DUR: 1.7 SEC
CV ZIO SVT L EPI END: NORMAL
CV ZIO SVT L EPI START: NORMAL
CV ZIO TOTAL  ENROLLMENT PERIOD: NORMAL
CV ZIO VT COUNT: 0

## 2025-02-26 ENCOUNTER — HOSPITAL ENCOUNTER (OUTPATIENT)
Dept: CARDIOLOGY | Facility: HOSPITAL | Age: 80
Discharge: HOME | End: 2025-02-26
Admitting: INTERNAL MEDICINE
Payer: MEDICARE

## 2025-02-26 VITALS
SYSTOLIC BLOOD PRESSURE: 130 MMHG | DIASTOLIC BLOOD PRESSURE: 62 MMHG | BODY MASS INDEX: 24.52 KG/M2 | HEIGHT: 72 IN | WEIGHT: 181 LBS

## 2025-02-26 DIAGNOSIS — R00.2 PALPITATIONS: ICD-10-CM

## 2025-02-26 LAB
AORTIC ARCH: 3.2 CM
AORTIC DIMENSIONLESS INDEX: 0.94 (DI)
ASCENDING AORTA: 3.3 CM
AV MEAN PRESS GRAD SYS DOP V1V2: 2.9 MMHG
AV VMAX SYS DOP: 116.8 CM/SEC
BH CV ECHO MEAS - ACS: 2.27 CM
BH CV ECHO MEAS - AO MAX PG: 5.5 MMHG
BH CV ECHO MEAS - AO ROOT AREA (BSA CORRECTED): 1.7 CM2
BH CV ECHO MEAS - AO ROOT DIAM: 3.6 CM
BH CV ECHO MEAS - AO V2 VTI: 26.8 CM
BH CV ECHO MEAS - AVA(I,D): 2.8 CM2
BH CV ECHO MEAS - EDV(CUBED): 57.7 ML
BH CV ECHO MEAS - EDV(MOD-SP2): 105 ML
BH CV ECHO MEAS - EDV(MOD-SP4): 106 ML
BH CV ECHO MEAS - EF(MOD-SP2): 63.8 %
BH CV ECHO MEAS - EF(MOD-SP4): 67 %
BH CV ECHO MEAS - ESV(CUBED): 17.3 ML
BH CV ECHO MEAS - ESV(MOD-SP2): 38 ML
BH CV ECHO MEAS - ESV(MOD-SP4): 35 ML
BH CV ECHO MEAS - FS: 33.1 %
BH CV ECHO MEAS - IVS/LVPW: 1.11 CM
BH CV ECHO MEAS - IVSD: 1.02 CM
BH CV ECHO MEAS - LAT PEAK E' VEL: 7.2 CM/SEC
BH CV ECHO MEAS - LV DIASTOLIC VOL/BSA (35-75): 51.9 CM2
BH CV ECHO MEAS - LV MASS(C)D: 114.8 GRAMS
BH CV ECHO MEAS - LV MAX PG: 4.9 MMHG
BH CV ECHO MEAS - LV MEAN PG: 2.7 MMHG
BH CV ECHO MEAS - LV SYSTOLIC VOL/BSA (12-30): 17.1 CM2
BH CV ECHO MEAS - LV V1 MAX: 111 CM/SEC
BH CV ECHO MEAS - LV V1 VTI: 25.4 CM
BH CV ECHO MEAS - LVIDD: 3.9 CM
BH CV ECHO MEAS - LVIDS: 2.6 CM
BH CV ECHO MEAS - LVOT AREA: 3 CM2
BH CV ECHO MEAS - LVOT DIAM: 1.94 CM
BH CV ECHO MEAS - LVPWD: 0.92 CM
BH CV ECHO MEAS - MED PEAK E' VEL: 6.2 CM/SEC
BH CV ECHO MEAS - MR MAX PG: 80.1 MMHG
BH CV ECHO MEAS - MR MAX VEL: 447.4 CM/SEC
BH CV ECHO MEAS - MV A DUR: 0.11 SEC
BH CV ECHO MEAS - MV A MAX VEL: 72.7 CM/SEC
BH CV ECHO MEAS - MV DEC SLOPE: 408 CM/SEC2
BH CV ECHO MEAS - MV DEC TIME: 0.23 SEC
BH CV ECHO MEAS - MV E MAX VEL: 99.5 CM/SEC
BH CV ECHO MEAS - MV E/A: 1.37
BH CV ECHO MEAS - MV MAX PG: 4.4 MMHG
BH CV ECHO MEAS - MV MEAN PG: 1.34 MMHG
BH CV ECHO MEAS - MV P1/2T: 75.8 MSEC
BH CV ECHO MEAS - MV V2 VTI: 33.4 CM
BH CV ECHO MEAS - MVA(P1/2T): 2.9 CM2
BH CV ECHO MEAS - MVA(VTI): 2.24 CM2
BH CV ECHO MEAS - PA ACC TIME: 0.18 SEC
BH CV ECHO MEAS - PA V2 MAX: 80.9 CM/SEC
BH CV ECHO MEAS - PULM A REVS DUR: 0.14 SEC
BH CV ECHO MEAS - PULM A REVS VEL: 50.4 CM/SEC
BH CV ECHO MEAS - PULM DIAS VEL: 67.9 CM/SEC
BH CV ECHO MEAS - PULM S/D: 1.14
BH CV ECHO MEAS - PULM SYS VEL: 77.1 CM/SEC
BH CV ECHO MEAS - RAP SYSTOLE: 3 MMHG
BH CV ECHO MEAS - RV MAX PG: 1.86 MMHG
BH CV ECHO MEAS - RV V1 MAX: 68.1 CM/SEC
BH CV ECHO MEAS - RV V1 VTI: 15.7 CM
BH CV ECHO MEAS - RVSP: 15 MMHG
BH CV ECHO MEAS - SV(LVOT): 74.8 ML
BH CV ECHO MEAS - SV(MOD-SP2): 67 ML
BH CV ECHO MEAS - SV(MOD-SP4): 71 ML
BH CV ECHO MEAS - SVI(LVOT): 36.7 ML/M2
BH CV ECHO MEAS - SVI(MOD-SP2): 32.8 ML/M2
BH CV ECHO MEAS - SVI(MOD-SP4): 34.8 ML/M2
BH CV ECHO MEAS - TAPSE (>1.6): 2.04 CM
BH CV ECHO MEAS - TR MAX PG: 12 MMHG
BH CV ECHO MEAS - TR MAX VEL: 172.9 CM/SEC
BH CV ECHO MEASUREMENTS AVERAGE E/E' RATIO: 14.85
BH CV XLRA - RV BASE: 3.6 CM
BH CV XLRA - RV LENGTH: 6.9 CM
BH CV XLRA - RV MID: 2.1 CM
BH CV XLRA - TDI S': 14.6 CM/SEC
LEFT ATRIUM VOLUME INDEX: 17.3 ML/M2
LV EF BIPLANE MOD: 64.9 %
SINUS: 2.9 CM

## 2025-02-26 PROCEDURE — 25510000001 PERFLUTREN 6.52 MG/ML SUSPENSION 2 ML VIAL: Performed by: INTERNAL MEDICINE

## 2025-02-26 PROCEDURE — 93306 TTE W/DOPPLER COMPLETE: CPT

## 2025-02-26 RX ADMIN — PERFLUTREN 3 ML: 6.52 INJECTION, SUSPENSION INTRAVENOUS at 08:20

## 2025-02-27 NOTE — PROGRESS NOTES
Please notify Dr. Hernadez echocardiogram shows an normal heart and valve function.  No significant abnormalities.  Dr. Nieto will see him in few weeks.  Let  me know if he has any questions for me.  Thank you

## 2025-03-12 ENCOUNTER — OFFICE VISIT (OUTPATIENT)
Age: 80
End: 2025-03-12
Payer: MEDICARE

## 2025-03-12 VITALS
DIASTOLIC BLOOD PRESSURE: 68 MMHG | HEART RATE: 64 BPM | BODY MASS INDEX: 23.57 KG/M2 | WEIGHT: 174 LBS | SYSTOLIC BLOOD PRESSURE: 134 MMHG | HEIGHT: 72 IN

## 2025-03-12 DIAGNOSIS — I47.10 PAROXYSMAL SVT (SUPRAVENTRICULAR TACHYCARDIA): Primary | ICD-10-CM

## 2025-03-12 DIAGNOSIS — Z01.810 PRE-OPERATIVE CARDIOVASCULAR EXAMINATION: Primary | ICD-10-CM

## 2025-03-12 DIAGNOSIS — Z13.6 SCREENING FOR ISCHEMIC HEART DISEASE: ICD-10-CM

## 2025-03-12 NOTE — PROGRESS NOTES
Date of Office Visit: 2025  Encounter Provider: Baldemar Nieto MD  Place of Service: [facility] CARDIOLOGY  Patient Name: Brad Hernadez  : 1945    Subjective:     Encounter Date:2025      Patient ID: Brad Hernadez is a 79 y.o. male who has a cc of  SVT and Deam did ablation in .     Dec 24 -- he had SVT and conv with adenosine.    Eight weeks later -- paramedics stopped it.     When not in SVT he is fine.         There have been no hospital admission since the last visit.     There have been no bleeding events.       Past Medical History:   Diagnosis Date    Diabetes mellitus     Hyperlipidemia     Hypertension     Supraventricular tachycardia        Social History     Socioeconomic History    Marital status:    Tobacco Use    Smoking status: Former     Types: Cigars     Passive exposure: Past    Smokeless tobacco: Never   Vaping Use    Vaping status: Never Used   Substance and Sexual Activity    Alcohol use: Yes     Comment: OCCASIONALLY    Drug use: Never    Sexual activity: Defer       No family history on file.    Review of Systems   Constitutional: Negative for fever and night sweats.   HENT:  Negative for ear pain and stridor.    Eyes:  Negative for discharge and visual halos.   Cardiovascular:  Negative for cyanosis.   Respiratory:  Negative for hemoptysis and sputum production.    Hematologic/Lymphatic: Negative for adenopathy.   Skin:  Negative for nail changes and unusual hair distribution.   Musculoskeletal:  Negative for gout and joint swelling.   Gastrointestinal:  Negative for bowel incontinence and flatus.   Genitourinary:  Negative for dysuria and flank pain.   Neurological:  Negative for seizures and tremors.   Psychiatric/Behavioral:  Negative for altered mental status. The patient is not nervous/anxious.             Objective:     Vitals:    25 1133   BP: 134/68   BP Location: Right arm   Patient Position: Sitting   Pulse: 64   Weight: 78.9  "kg (174 lb)   Height: 182.9 cm (72\")         Eyes:      General:         Right eye: No discharge.         Left eye: No discharge.   HENT:      Head: Normocephalic and atraumatic.   Neck:      Thyroid: No thyromegaly.      Vascular: No JVD.   Pulmonary:      Effort: Pulmonary effort is normal.      Breath sounds: Normal breath sounds. No rales.   Cardiovascular:      Normal rate. Regular rhythm.      No gallop.    Edema:     Peripheral edema absent.   Abdominal:      General: Bowel sounds are normal.      Palpations: Abdomen is soft.      Tenderness: There is no abdominal tenderness.   Musculoskeletal: Normal range of motion.         General: No deformity. Skin:     General: Skin is warm and dry.      Findings: No erythema.   Neurological:      Mental Status: Alert and oriented to person, place, and time.      Motor: Normal muscle tone.   Psychiatric:         Behavior: Behavior normal.         Thought Content: Thought content normal.           ECG 12 Lead    Date/Time: 3/12/2025 11:54 AM  Performed by: Baldemar Nieto MD    Authorized by: Baldemar Nieto MD  Comparison: compared with previous ECG   Similar to previous ECG  Rhythm: sinus rhythm          Lab Review:       Assessment:          Diagnosis Plan   1. Paroxysmal SVT (supraventricular tachycardia)               Plan:     I think we do ablation. He obv has recurrent SVT     Risks discussed           "

## 2025-04-08 ENCOUNTER — LAB (OUTPATIENT)
Facility: HOSPITAL | Age: 80
End: 2025-04-08
Payer: MEDICARE

## 2025-04-08 DIAGNOSIS — Z01.810 PRE-OPERATIVE CARDIOVASCULAR EXAMINATION: ICD-10-CM

## 2025-04-08 DIAGNOSIS — Z13.6 SCREENING FOR ISCHEMIC HEART DISEASE: ICD-10-CM

## 2025-04-08 LAB
ANION GAP SERPL CALCULATED.3IONS-SCNC: 12 MMOL/L (ref 5–15)
BASOPHILS # BLD AUTO: 0.06 10*3/MM3 (ref 0–0.2)
BASOPHILS NFR BLD AUTO: 0.7 % (ref 0–1.5)
BUN SERPL-MCNC: 16 MG/DL (ref 8–23)
BUN/CREAT SERPL: 14.5 (ref 7–25)
CALCIUM SPEC-SCNC: 10.1 MG/DL (ref 8.6–10.5)
CHLORIDE SERPL-SCNC: 101 MMOL/L (ref 98–107)
CO2 SERPL-SCNC: 26 MMOL/L (ref 22–29)
CREAT SERPL-MCNC: 1.1 MG/DL (ref 0.76–1.27)
DEPRECATED RDW RBC AUTO: 40.6 FL (ref 37–54)
EGFRCR SERPLBLD CKD-EPI 2021: 68.3 ML/MIN/1.73
EOSINOPHIL # BLD AUTO: 1.2 10*3/MM3 (ref 0–0.4)
EOSINOPHIL NFR BLD AUTO: 13.3 % (ref 0.3–6.2)
ERYTHROCYTE [DISTWIDTH] IN BLOOD BY AUTOMATED COUNT: 13.3 % (ref 12.3–15.4)
GLUCOSE SERPL-MCNC: 120 MG/DL (ref 65–99)
HCT VFR BLD AUTO: 40.2 % (ref 37.5–51)
HGB BLD-MCNC: 14.1 G/DL (ref 13–17.7)
IMM GRANULOCYTES # BLD AUTO: 0.04 10*3/MM3 (ref 0–0.05)
IMM GRANULOCYTES NFR BLD AUTO: 0.4 % (ref 0–0.5)
LYMPHOCYTES # BLD AUTO: 2.14 10*3/MM3 (ref 0.7–3.1)
LYMPHOCYTES NFR BLD AUTO: 23.7 % (ref 19.6–45.3)
MCH RBC QN AUTO: 29.6 PG (ref 26.6–33)
MCHC RBC AUTO-ENTMCNC: 35.1 G/DL (ref 31.5–35.7)
MCV RBC AUTO: 84.3 FL (ref 79–97)
MONOCYTES # BLD AUTO: 0.55 10*3/MM3 (ref 0.1–0.9)
MONOCYTES NFR BLD AUTO: 6.1 % (ref 5–12)
NEUTROPHILS NFR BLD AUTO: 5.03 10*3/MM3 (ref 1.7–7)
NEUTROPHILS NFR BLD AUTO: 55.8 % (ref 42.7–76)
NRBC BLD AUTO-RTO: 0 /100 WBC (ref 0–0.2)
PLATELET # BLD AUTO: 249 10*3/MM3 (ref 140–450)
PMV BLD AUTO: 11 FL (ref 6–12)
POTASSIUM SERPL-SCNC: 4.5 MMOL/L (ref 3.5–5.2)
RBC # BLD AUTO: 4.77 10*6/MM3 (ref 4.14–5.8)
SODIUM SERPL-SCNC: 139 MMOL/L (ref 136–145)
WBC NRBC COR # BLD AUTO: 9.02 10*3/MM3 (ref 3.4–10.8)

## 2025-04-08 PROCEDURE — 80048 BASIC METABOLIC PNL TOTAL CA: CPT

## 2025-04-08 PROCEDURE — 36415 COLL VENOUS BLD VENIPUNCTURE: CPT

## 2025-04-08 PROCEDURE — 85025 COMPLETE CBC W/AUTO DIFF WBC: CPT

## 2025-04-15 ENCOUNTER — HOSPITAL ENCOUNTER (OUTPATIENT)
Facility: HOSPITAL | Age: 80
Setting detail: HOSPITAL OUTPATIENT SURGERY
Discharge: HOME | End: 2025-04-15
Attending: INTERNAL MEDICINE | Admitting: INTERNAL MEDICINE
Payer: MEDICARE

## 2025-04-15 VITALS
SYSTOLIC BLOOD PRESSURE: 138 MMHG | HEART RATE: 70 BPM | HEIGHT: 73 IN | OXYGEN SATURATION: 98 % | BODY MASS INDEX: 22.66 KG/M2 | TEMPERATURE: 97.1 F | DIASTOLIC BLOOD PRESSURE: 80 MMHG | WEIGHT: 171 LBS | RESPIRATION RATE: 18 BRPM

## 2025-04-15 DIAGNOSIS — I47.10 PAROXYSMAL SVT (SUPRAVENTRICULAR TACHYCARDIA): ICD-10-CM

## 2025-04-15 LAB
QT INTERVAL: 426 MS
QT INTERVAL: 470 MS
QTC INTERVAL: 428 MS
QTC INTERVAL: 478 MS

## 2025-04-15 PROCEDURE — 93623 PRGRMD STIMJ&PACG IV RX NFS: CPT | Performed by: INTERNAL MEDICINE

## 2025-04-15 PROCEDURE — C1894 INTRO/SHEATH, NON-LASER: HCPCS | Performed by: INTERNAL MEDICINE

## 2025-04-15 PROCEDURE — 25010000002 FENTANYL CITRATE (PF) 50 MCG/ML SOLUTION: Performed by: INTERNAL MEDICINE

## 2025-04-15 PROCEDURE — 93653 COMPRE EP EVAL TX SVT: CPT | Performed by: INTERNAL MEDICINE

## 2025-04-15 PROCEDURE — 25810000003 SODIUM CHLORIDE 0.9 % SOLUTION 250 ML FLEX CONT: Performed by: INTERNAL MEDICINE

## 2025-04-15 PROCEDURE — 93005 ELECTROCARDIOGRAM TRACING: CPT | Performed by: INTERNAL MEDICINE

## 2025-04-15 PROCEDURE — C1893 INTRO/SHEATH, FIXED,NON-PEEL: HCPCS | Performed by: INTERNAL MEDICINE

## 2025-04-15 PROCEDURE — 25010000002 MIDAZOLAM PER 1 MG: Performed by: INTERNAL MEDICINE

## 2025-04-15 PROCEDURE — 93010 ELECTROCARDIOGRAM REPORT: CPT | Performed by: INTERNAL MEDICINE

## 2025-04-15 PROCEDURE — C1732 CATH, EP, DIAG/ABL, 3D/VECT: HCPCS | Performed by: INTERNAL MEDICINE

## 2025-04-15 PROCEDURE — 25810000003 SODIUM CHLORIDE 0.9 % SOLUTION: Performed by: INTERNAL MEDICINE

## 2025-04-15 PROCEDURE — S0260 H&P FOR SURGERY: HCPCS | Performed by: INTERNAL MEDICINE

## 2025-04-15 PROCEDURE — C1730 CATH, EP, 19 OR FEW ELECT: HCPCS | Performed by: INTERNAL MEDICINE

## 2025-04-15 PROCEDURE — 93005 ELECTROCARDIOGRAM TRACING: CPT | Performed by: PHYSICIAN ASSISTANT

## 2025-04-15 PROCEDURE — 25010000002 LIDOCAINE-EPINEPHRINE (PF) 1 %-1:200000 SOLUTION: Performed by: INTERNAL MEDICINE

## 2025-04-15 RX ORDER — SODIUM CHLORIDE 9 MG/ML
75 INJECTION, SOLUTION INTRAVENOUS CONTINUOUS
Status: ACTIVE | OUTPATIENT
Start: 2025-04-15 | End: 2025-04-15

## 2025-04-15 RX ORDER — LOSARTAN POTASSIUM 25 MG/1
25 TABLET ORAL DAILY
Qty: 90 TABLET | Refills: 1 | Status: SHIPPED | OUTPATIENT
Start: 2025-04-15 | End: 2025-04-15

## 2025-04-15 RX ORDER — SODIUM CHLORIDE 0.9 % (FLUSH) 0.9 %
10 SYRINGE (ML) INJECTION EVERY 12 HOURS SCHEDULED
Status: DISCONTINUED | OUTPATIENT
Start: 2025-04-15 | End: 2025-04-15 | Stop reason: HOSPADM

## 2025-04-15 RX ORDER — MIDAZOLAM HYDROCHLORIDE 1 MG/ML
INJECTION, SOLUTION INTRAMUSCULAR; INTRAVENOUS
Status: DISCONTINUED | OUTPATIENT
Start: 2025-04-15 | End: 2025-04-15 | Stop reason: HOSPADM

## 2025-04-15 RX ORDER — ACETAMINOPHEN 325 MG/1
650 TABLET ORAL EVERY 4 HOURS PRN
Status: DISCONTINUED | OUTPATIENT
Start: 2025-04-15 | End: 2025-04-15 | Stop reason: HOSPADM

## 2025-04-15 RX ORDER — FENTANYL CITRATE 50 UG/ML
INJECTION, SOLUTION INTRAMUSCULAR; INTRAVENOUS
Status: DISCONTINUED | OUTPATIENT
Start: 2025-04-15 | End: 2025-04-15 | Stop reason: HOSPADM

## 2025-04-15 RX ORDER — ACETAMINOPHEN 650 MG/1
650 SUPPOSITORY RECTAL EVERY 4 HOURS PRN
Status: DISCONTINUED | OUTPATIENT
Start: 2025-04-15 | End: 2025-04-15 | Stop reason: HOSPADM

## 2025-04-15 RX ORDER — METHOHEXITAL IN WATER/PF 100MG/10ML
SYRINGE (ML) INTRAVENOUS
Status: DISCONTINUED | OUTPATIENT
Start: 2025-04-15 | End: 2025-04-15 | Stop reason: HOSPADM

## 2025-04-15 RX ORDER — SODIUM CHLORIDE 0.9 % (FLUSH) 0.9 %
10 SYRINGE (ML) INJECTION AS NEEDED
Status: DISCONTINUED | OUTPATIENT
Start: 2025-04-15 | End: 2025-04-15 | Stop reason: HOSPADM

## 2025-04-15 RX ORDER — LOSARTAN POTASSIUM 100 MG/1
100 TABLET ORAL DAILY
Start: 2025-04-15

## 2025-04-15 RX ADMIN — SODIUM CHLORIDE 75 ML/HR: 9 INJECTION, SOLUTION INTRAVENOUS at 12:14

## 2025-04-15 NOTE — Clinical Note
The DP pulses are +2 bilaterally. The PT pulses are +1 bilaterally. JUDD heels dry, intact, and elevated.

## 2025-04-15 NOTE — H&P
"Brad Hernadez is a 79 y.o. male who has a cc of  SVT and Deam did ablation in 2020.      Dec 24 -- he had SVT and conv with adenosine.     Eight weeks later -- paramedics stopped it.      When not in SVT he is fine.           There have been no hospital admission since the last visit.      There have been no bleeding events.         Medical History        Past Medical History:   Diagnosis Date    Diabetes mellitus      Hyperlipidemia      Hypertension      Supraventricular tachycardia              Social History   Social History            Socioeconomic History    Marital status:    Tobacco Use    Smoking status: Former       Types: Cigars       Passive exposure: Past    Smokeless tobacco: Never   Vaping Use    Vaping status: Never Used   Substance and Sexual Activity    Alcohol use: Yes       Comment: OCCASIONALLY    Drug use: Never    Sexual activity: Defer            No family history on file.     Review of Systems   Constitutional: Negative for fever and night sweats.   HENT:  Negative for ear pain and stridor.    Eyes:  Negative for discharge and visual halos.   Cardiovascular:  Negative for cyanosis.   Respiratory:  Negative for hemoptysis and sputum production.    Hematologic/Lymphatic: Negative for adenopathy.   Skin:  Negative for nail changes and unusual hair distribution.   Musculoskeletal:  Negative for gout and joint swelling.   Gastrointestinal:  Negative for bowel incontinence and flatus.   Genitourinary:  Negative for dysuria and flank pain.   Neurological:  Negative for seizures and tremors.   Psychiatric/Behavioral:  Negative for altered mental status. The patient is not nervous/anxious.                Objective:      Vitals       Vitals:     03/12/25 1133   BP: 134/68   BP Location: Right arm   Patient Position: Sitting   Pulse: 64   Weight: 78.9 kg (174 lb)   Height: 182.9 cm (72\")            Objective  Eyes:      General:         Right eye: No discharge.         Left eye: No discharge. "   HENT:      Head: Normocephalic and atraumatic.   Neck:      Thyroid: No thyromegaly.      Vascular: No JVD.   Pulmonary:      Effort: Pulmonary effort is normal.      Breath sounds: Normal breath sounds. No rales.   Cardiovascular:      Normal rate. Regular rhythm.      No gallop.    Edema:     Peripheral edema absent.   Abdominal:      General: Bowel sounds are normal.      Palpations: Abdomen is soft.      Tenderness: There is no abdominal tenderness.   Musculoskeletal: Normal range of motion.         General: No deformity. Skin:     General: Skin is warm and dry.      Findings: No erythema.   Neurological:      Mental Status: Alert and oriented to person, place, and time.      Motor: Normal muscle tone.   Psychiatric:         Behavior: Behavior normal.         Thought Content: Thought content normal.               ECG 12 Lead     Date/Time: 3/12/2025 11:54 AM  Performed by: Baldemar Nieto MD     Authorized by: Baldemar Nieto MD  Comparison: compared with previous ECG   Similar to previous ECG  Rhythm: sinus rhythm              Lab Review:         Assessment:      Assessment    Diagnosis Plan   1. Paroxysmal SVT (supraventricular tachycardia)                   Plan:      I think we do ablation. He obv has recurrent SVT     Risks discussed

## 2025-04-15 NOTE — Clinical Note
Hemostasis started on the left femoral vein. Manual pressure applied to vessel. Manual pressure was held by CHRISTIAN Sommer RTR. Manual pressure was held for 10 min. Hemostasis achieved successfully. Closure device additional comment: Pressure held, gauze and tegaderm

## 2025-04-15 NOTE — Clinical Note
with ultrasound guidance into the right femoral vein. Sheath insertion delayed. Access wire only in place

## 2025-04-15 NOTE — DISCHARGE INSTRUCTIONS
[facility]  Cardiology  [address] [place] [address]  [phone]    Coronary Ablation After Care    Refer to this sheet in the next few weeks. These instructions provide you with information on caring for yourself after your procedure. Your health care provider may also give you more specific instructions. Your treatment has been planned according to current medical practices, but problems sometimes occur. Call your health care provider if you have any problems or questions after your procedure.      What to Expect After the Procedure:  After your procedure, it is typical to have the following sensations:  Minor discomfort or tenderness and a small bump at the catheter insertion site(s). The bump(s) should usually decrease in size and tenderness within 1 to 2 weeks.  Any bruising will usually fade within 2 to 4 weeks.  Home Care Instructions:  Do not apply powder or lotion to the site.  Do not take baths, swim, or use a hot tub until your health care provider approves and the site is completely healed.  Do not bend, squat, or lift anything over 20 lb (9 kg) or as directed by your health care provider. However, we recommend lifting nothing heavier than a gallon of milk.    You may shower 24 hours after the procedure. Remove the bandage (dressing) and gently wash the site with plain soap and water. Gently pat the site dry. You may apply a band aid daily for 2 days if desired.    Inspect the site at least twice daily.  Increase your fluid intake for the next 2 days.    Limit your activity for the first 48 hours.   Avoid strenuous activity for 1 week or as advised by your physician.    Follow instructions about when you can drive or return to work as directed by your physician.    Hold direct pressure over the site when you cough, sneeze, laugh or change positions.  Do this for the next 2 days.    Call Your Doctor If:  You have drainage (other than a small amount of blood on the dressing).  You  have chills or a fever > 101.  You have redness, warmth, swelling (larger than a walnut), or pain at the insertion   You develop palpitations, chest pain or shortness of breath, feel faint, or pass out.  You develop pain, discoloration, coldness, numbness, tingling, or severe bruising in the leg that held the catheter.  You develop bleeding from any other place, such as the bowels.  You have heavy bleeding from the site.  If this happens, hold pressure on the site and call 911.        Make Sure You:  Understand these instructions.  Will watch your condition.  Will get help right away if you are not doing well or get worse.

## 2025-04-15 NOTE — Clinical Note
Hemostasis started on the right femoral vein. Manual pressure applied to vessel. Manual pressure was held by CHRISTIAN Sommer RTR. Manual pressure was held for 10 min. Hemostasis achieved successfully. Closure device additional comment: Pressure held, gauze and tegaderm

## 2025-04-15 NOTE — Clinical Note
using micropuncture technique with ultrasound guidance into the right femoral vein. Sheath insertion delayed. Leaving micropuncture wire in place in case second RFV sheath needed.

## (undated) DEVICE — Device: Brand: EZ STEER NAV

## (undated) DEVICE — PINNACLE INTRODUCER SHEATH: Brand: PINNACLE

## (undated) DEVICE — Device: Brand: WEBSTER

## (undated) DEVICE — Device: Brand: REFERENCE PATCH CARTO 3

## (undated) DEVICE — CATH ABL SAFIRE BIDIR MC 7F 4MM

## (undated) DEVICE — PREF.GUIDING SHEATH W/MULT.CRV: Brand: PREFACE

## (undated) DEVICE — CATH EP SUPREME QUADPOLAR CRD2 5F 5MM 120CM

## (undated) DEVICE — SKIN PREP TRAY W/CHG: Brand: MEDLINE INDUSTRIES, INC.

## (undated) DEVICE — LOU EP: Brand: MEDLINE INDUSTRIES, INC.

## (undated) DEVICE — INTRO HEMO FASTCATH CATH/LOCK 7F 12CM

## (undated) DEVICE — INTRO STEER AGILIS NXT MED/CURL 8.5F

## (undated) DEVICE — INTRO HEMO TRIO 12F

## (undated) DEVICE — CATH EP INQUIRY DECAPLR CRV 6F 2TO5TO2MM 110CM LG

## (undated) DEVICE — KT ELECTRD ENSITE PRECISION SURF

## (undated) DEVICE — Device

## (undated) DEVICE — CATH EP SUPRM QUADPOLAR JSN1 6F 5MM 120CM